# Patient Record
Sex: FEMALE | Race: WHITE | NOT HISPANIC OR LATINO | Employment: UNEMPLOYED | URBAN - METROPOLITAN AREA
[De-identification: names, ages, dates, MRNs, and addresses within clinical notes are randomized per-mention and may not be internally consistent; named-entity substitution may affect disease eponyms.]

---

## 2017-08-17 ENCOUNTER — HOSPITAL ENCOUNTER (OUTPATIENT)
Dept: MEDSURG UNIT | Facility: HOSPITAL | Age: 32
End: 2017-08-18
Attending: ORTHOPAEDIC SURGERY | Admitting: ORTHOPAEDIC SURGERY

## 2017-08-17 LAB
ABS NEUT (OLG): 8.22 X10(3)/MCL (ref 2.1–9.2)
APTT PPP: 30.7 SECOND(S) (ref 20.6–36)
BASOPHILS # BLD AUTO: 0 X10(3)/MCL (ref 0–0.2)
BASOPHILS NFR BLD AUTO: 0 %
BUN SERPL-MCNC: 13 MG/DL (ref 7–18)
CALCIUM SERPL-MCNC: 8.4 MG/DL (ref 8.5–10.1)
CHLORIDE SERPL-SCNC: 104 MMOL/L (ref 98–107)
CO2 SERPL-SCNC: 27 MMOL/L (ref 21–32)
CREAT SERPL-MCNC: 0.8 MG/DL (ref 0.55–1.02)
EOSINOPHIL # BLD AUTO: 0.2 X10(3)/MCL (ref 0–0.9)
EOSINOPHIL NFR BLD AUTO: 2 %
ERYTHROCYTE [DISTWIDTH] IN BLOOD BY AUTOMATED COUNT: 13.9 % (ref 11.5–17)
GLUCOSE SERPL-MCNC: 101 MG/DL (ref 74–106)
HCT VFR BLD AUTO: 34.4 % (ref 37–47)
HCT VFR BLD AUTO: 35.2 % (ref 37–47)
HGB BLD-MCNC: 11.2 GM/DL (ref 12–16)
HGB BLD-MCNC: 11.3 GM/DL (ref 12–16)
INR PPP: 1.03 (ref 0–1.27)
LYMPHOCYTES # BLD AUTO: 2.4 X10(3)/MCL (ref 0.6–4.6)
LYMPHOCYTES NFR BLD AUTO: 20 %
MCH RBC QN AUTO: 28.6 PG (ref 27–31)
MCHC RBC AUTO-ENTMCNC: 32.8 GM/DL (ref 33–36)
MCV RBC AUTO: 87.1 FL (ref 80–94)
MONOCYTES # BLD AUTO: 0.9 X10(3)/MCL (ref 0.1–1.3)
MONOCYTES NFR BLD AUTO: 8 %
NEUTROPHILS # BLD AUTO: 8.22 X10(3)/MCL (ref 1.4–7.9)
NEUTROPHILS NFR BLD AUTO: 69 %
PLATELET # BLD AUTO: 265 X10(3)/MCL (ref 130–400)
PMV BLD AUTO: 9.4 FL (ref 9.4–12.4)
POTASSIUM SERPL-SCNC: 4.3 MMOL/L (ref 3.5–5.1)
PROTHROMBIN TIME: 13.3 SECOND(S) (ref 12.1–14.2)
RBC # BLD AUTO: 3.95 X10(6)/MCL (ref 4.2–5.4)
SODIUM SERPL-SCNC: 138 MMOL/L (ref 136–145)
WBC # SPEC AUTO: 11.8 X10(3)/MCL (ref 4.5–11.5)

## 2017-08-18 LAB
ABS NEUT (OLG): 8.46 X10(3)/MCL (ref 2.1–9.2)
BASOPHILS # BLD AUTO: 0 X10(3)/MCL (ref 0–0.2)
BASOPHILS NFR BLD AUTO: 0 %
BUN SERPL-MCNC: 11 MG/DL (ref 7–18)
CALCIUM SERPL-MCNC: 8.4 MG/DL (ref 8.5–10.1)
CHLORIDE SERPL-SCNC: 106 MMOL/L (ref 98–107)
CO2 SERPL-SCNC: 26 MMOL/L (ref 21–32)
CREAT SERPL-MCNC: 0.71 MG/DL (ref 0.55–1.02)
EOSINOPHIL # BLD AUTO: 0 X10(3)/MCL (ref 0–0.9)
EOSINOPHIL NFR BLD AUTO: 0 %
ERYTHROCYTE [DISTWIDTH] IN BLOOD BY AUTOMATED COUNT: 14.2 % (ref 11.5–17)
GLUCOSE SERPL-MCNC: 108 MG/DL (ref 74–106)
HCT VFR BLD AUTO: 33.7 % (ref 37–47)
HGB BLD-MCNC: 10.5 GM/DL (ref 12–16)
LYMPHOCYTES # BLD AUTO: 3 X10(3)/MCL (ref 0.6–4.6)
LYMPHOCYTES NFR BLD AUTO: 23 %
MCH RBC QN AUTO: 27.4 PG (ref 27–31)
MCHC RBC AUTO-ENTMCNC: 31.2 GM/DL (ref 33–36)
MCV RBC AUTO: 88 FL (ref 80–94)
MONOCYTES # BLD AUTO: 1.2 X10(3)/MCL (ref 0.1–1.3)
MONOCYTES NFR BLD AUTO: 9 %
NEUTROPHILS # BLD AUTO: 8.46 X10(3)/MCL (ref 2.1–9.2)
NEUTROPHILS NFR BLD AUTO: 67 %
PLATELET # BLD AUTO: 279 X10(3)/MCL (ref 130–400)
PMV BLD AUTO: 9.6 FL (ref 9.4–12.4)
POTASSIUM SERPL-SCNC: 4.2 MMOL/L (ref 3.5–5.1)
RBC # BLD AUTO: 3.83 X10(6)/MCL (ref 4.2–5.4)
SODIUM SERPL-SCNC: 141 MMOL/L (ref 136–145)
WBC # SPEC AUTO: 12.7 X10(3)/MCL (ref 4.5–11.5)

## 2017-08-29 LAB — POC BETA-HCG (QUAL): NEGATIVE

## 2017-10-11 ENCOUNTER — HISTORICAL (OUTPATIENT)
Dept: ADMINISTRATIVE | Facility: HOSPITAL | Age: 32
End: 2017-10-11

## 2017-11-22 ENCOUNTER — HISTORICAL (OUTPATIENT)
Dept: ADMINISTRATIVE | Facility: HOSPITAL | Age: 32
End: 2017-11-22

## 2018-01-15 ENCOUNTER — HISTORICAL (OUTPATIENT)
Dept: ADMINISTRATIVE | Facility: HOSPITAL | Age: 33
End: 2018-01-15

## 2018-04-03 ENCOUNTER — HISTORICAL (OUTPATIENT)
Dept: ADMINISTRATIVE | Facility: HOSPITAL | Age: 33
End: 2018-04-03

## 2021-09-08 ENCOUNTER — HISTORICAL (OUTPATIENT)
Dept: HEMATOLOGY/ONCOLOGY | Facility: CLINIC | Age: 36
End: 2021-09-08

## 2022-04-29 NOTE — OP NOTE
DATE OF SURGERY:    08/17/2017    SURGEON:  Hardik Calix MD    PREOPERATIVE DIAGNOSIS:  Left bicondylar tibia plateau fracture.    POSTOPERATIVE DIAGNOSIS:  Left bicondylar tibia plateau fracture.    PROCEDURE:  Application of uniplanar external fixator left lower extremity.     Anesthesia:  General endotracheal anesthesia.     Estimated blood loss:  5 ml.     Implants:  [place] Thelma 3 external fixator system.     Complications:  None.     Counts:  All counts correct x two at the end of the case.    INDICATIONS FOR PROCEDURE:  Mrs. Garcia is a 32-year-old female who sustained a left displaced bicondylar tibial plateau fracture with lateral joint line depression.  She had significant pain, swelling and ecchymosis.  She presented to my office approximately four days after the injury.  Her limb was too swollen to undergo definitive management.  She had significant pain with any range of motion.  We discussed the risks and benefits and alternatives to treatment and she elected to undergo application of external fixator in order to allow for more rigid stabilization and soft tissue rest with plans for a future staged procedure with removal of the external fixator and definitive internal fixation once her soft tissue swelling has diminished.    PROCEDURE IN DETAIL:  After informed consent was obtained the patient was met in the preoperative holding area and the site was marked.  She was taken to the operating room and placed supine on the operating table and general endotracheal anesthesia was induced.  All bony prominences were well padded and preoperative antibiotics were given.  Her left lower extremity was prepped and draped in the standard sterile fashion.  A time out was done to indicate the correct operative limb and procedure.  Starting first with pins in the femur they were localized under fluoroscopic guidance and two pins were placed.  They were confirmed to be in appropriate position.  A multihole  clamp was then applied and two pins were then placed anterior to posterior in the tibia.  They were predrilled and again a multihole clamp was applied.  Cross connectors were applied.  She was brought out of varus alignment.  She was noted to be in good position on the lateral view.  The bars were locked into position and all were confirmed to be appropriately tightened and her limb was stabilized.  Pin sites were cleaned and dressed with Xeroform and Kerlix rolls and her limb was dressed with cast padding and an Ace bandage.  The patient was awakened, extubated and taken to recovery in stable condition.    POSTOPERATIVE PLAN:  She will be admitted to the floor for observation for pain control overnight tonight.  We will have her mobilize with PT and plan for discharge home tomorrow.  She will return to the clinic next week.  We will evaluate her soft tissues and plan for future fixation of her fracture at that time.        ______________________________  MD SIM Cho/JUDI  DD:  08/17/2017  Time:  10:28AM  DT:  08/17/2017  Time:  03:31PM  Job #:  [id number]

## 2022-04-29 NOTE — DISCHARGE SUMMARY
* Final Report *  Ortho Progress Note     Patient:   Maggie Garcia             MRN: [id number]            FIN: [id number]               Age:   32 years     Sex:  Female     :  1985   Associated Diagnoses:   Displaced bicondylar fracture of left tibia, initial encounter for closed fracture   Author:   Hardik Calix MD      Basic Information   POD#1 s/p ex-fix to LLE for tibia plateau fracture. Doing very well today. Pain well controlled. Plan to go home today.      Physical Examination   General:  Alert and oriented, No acute distress.       Vital Signs (last 24 hrs)_____  Last Charted___________  Temp Oral     36.5 DegC  (AUG 18 03:40)  Heart Rate Peripheral   74 bpm  (AUG 18 03:40)  Resp Rate         16 br/min  (AUG 18 03:40)  SBP      112 mmHg  (AUG 18 03:40)  DBP      74 mmHg  (AUG 18 03:40)  SpO2      98 %  (AUG 18 03:40)  Weight      104.32 kg  (AUG 17 08:04)  Height      172.72 cm  (AUG 17 08:04)  BMI      34.97  (AUG 17 08:04)     Musculoskeletal:  LLE: Dressings all clean and dry. Ex-fix in place. Brisk cap refill to all digits. Intact ehl/fhl, ta/gs. .    Neurologic:  Alert, Oriented.    Psychiatric:  Cooperative, Appropriate mood & affect.       Impression and Plan   Diagnosis     Displaced bicondylar fracture of left tibia, initial encounter for closed fracture ([id number] S82.142A).       Doing well and ready to go home today. She will follow up with me in 1 week to evaluate her swelling and plan for definitive internal fixation. Remain NWB to LLE. Change dressing daily starting in 48 hours.      Result type: Orthopedic Progress Note  Result date: 2017 7:22 CDT  Result status: Auth (Verified)  Result title: Ortho Progress Note  Performed by: Hardik Calix MD on 2017 7:26 CDT  Verified by: Hardik Calix MD on 2017 7:26 CDT  Encounter info: [id number], [facility], Observation, 2017 - 2017    Moved by HIM

## 2022-05-03 NOTE — HISTORICAL OLG CERNER
This is a historical note converted from Carmen. Formatting and pictures may have been removed.  Please reference Carmen for original formatting and attached multimedia. Chief Complaint  6 WEEK FOLLOW UP LEFT TIBIAL PLATEAU FX, S/P ORIF, NWB,  History of Present Illness  Here for follow-up evaluation status post?open reduction internal fixation left displaced?depressed lateral tibial plateau fracture with repair of lateral meniscus.?Shes been doing well, working with physical therapy on range of motion.?She is off all pain medicine at this time.?Shes been compliant with her nonweightbearing to left lower extremity.  Review of Systems  Negative suction history of present illness  Physical Exam  Vitals & Measurements  HR:?98?(Peripheral)? RR:?20? BP:?117/75?  HT:?163?cm? HT:?163?cm? WT:?51?kg? WT:?51?kg? BMI:?19.2?  Left lower extremity: No calf?swelling, tenderness or redness. Surgical incisions well-healed.?No effusion noted knee.?Lacking less than 5??of full extension with flexion up to 95?. No varus valgus instability noted. 2+ DP pulse.?Neurovascularly intact distally.  Assessment/Plan  Displaced bicondylar fracture of left tibia, initial encounter for closed fracture  ?  ?  She is doing very well today happy with the progress that shes made. She can discontinue the use of her hinged knee brace at this time.?Well update her physical therapy orders to have her begin a partial weightbearing?program in 4 weeks with increasing?percentages over the subsequent weeks.?She will return to me in 6 weeks with repeat x-rays left knee.?We discussed the need for her to focus on obtaining full extension?and working on the flexion in her knee?work on her strengthening.?She is happy with her progress?and understands and agrees?with our plan today .  ?  ?  Ordered:  Clinic Follow up, *Est. 11/22/17 9:45:00 CST, Order for future visit, Displaced bicondylar fracture of left tibia, initial encounter for closed fracture, [facility]  [place]  Post-Op follow-up visit [id number] PC, Displaced bicondylar fracture of left tibia, initial encounter for closed fracture, [facility], 10/11/17 11:21:00 CDT  PT/OT External Referral, 10/11/17 11:23:00 CDT, Displaced bicondylar fracture of left tibia, initial encounter for closed fracture, Mobilization  Stretch and Strengthen  Therapeutic Excercise, 25% Wt Bearing X 1 week, 50% Wt Bearing 2nd Week, 75% WB 3rd Week; then FWB, 3 X...  XR Knee Left 1 or 2 Views, Routine, *Est. 11/22/17 3:00:00 CST, Post-Op, None, Patient Bed, Patient Has IV?, Rad Type, Order for future visit, Displaced bicondylar fracture of left tibia, initial encounter for closed fracture, Not Scheduled, 6, week(s), In Approximately, *Est....  ?   Problem List/Past Medical History  Ongoing  Acute deep vein thrombosis (DVT) of left lower extremity  Closed fracture of left tibial plateau  Displaced bicondylar fracture of left tibia, initial encounter for closed fracture  Obesity  Tear of lateral meniscus of left knee  Historical  Procedure/Surgical History  ORIF Tibia Plateau (Left) (08/29/2017)  Removal External Fixator Lower Extremity (Left) (08/29/2017)  Removal of External Fixation Device from Left Tibia, External Approach (08/29/2017)  Repair Left Knee Bursa and Ligament, Open Approach (08/29/2017)  Reposition Left Tibia with Internal Fixation Device, Open Approach (08/29/2017)  Application of a uniplane (pins or wires in 1 plane), unilateral, external fixation system (08/17/2017)  External Fixation Device Application Lower (Left) (08/17/2017)  Insertion of External Fixation Device into Left Tibia, Percutaneous Approach (08/17/2017)  Medications  Valium 5 mg oral tablet, 5 mg= 1 tab(s), Oral, QID, PRN,? ?Not taking  Allergies  No Known Medication Allergies  Social History  Alcohol - 08/24/2017  Current, 1-2 times per month  Substance Abuse - 08/24/2017  Never  Tobacco - 08/24/2017  Former smoker, Stopped age 24 Years.  Family  History  Acute myocardial infarction.: Mother.  Diabetes mellitus type 1.: Mother.  Hypertension.: Mother.  Primary malignant neoplasm of female genital organ: Mother.  Diagnostic Results  Left knee 2 views:?Hardware intact. Alignment maintained.?No evidence of?settling?of the lateral plateau

## 2022-05-03 NOTE — HISTORICAL OLG CERNER
This is a historical note converted from Carmen. Formatting and pictures may have been removed.  Please reference Carmen for original formatting and attached multimedia. Chief Complaint  4.5 MONTH FOLLOW UP LEFT TIBIAL PLATEAU ORIF, NO COMPLAINTS, AMBULATING WITHOUT ASSISTANCE  History of Present Illness  Pt now 4.5 months s/p left bicondylar tibial plateau ORIF. Here today for x-rays. Doing well overall.  Review of Systems  Review of Systems?  ????Constitutional: ?Negative. ?  ????Eye: ?Negative. ?  ????Ear/Nose/Mouth/Throat: ?Negative. ?  ????Respiratory: ?Negative. ?  ????Cardiovascular: ?Negative. ?  ????Gastrointestinal: ?Negative. ?  ????Genitourinary: ?Negative. ?  ????Hematology/Lymphatics: ?Negative. ?  ????Endocrine: ?Negative. ?  ????Immunologic: ?Negative. ?  ????Musculoskeletal: ?Negative except HPI. ?  ????Integumentary: ?Negative. ?  ????Neurologic: ?Negative. ?  ????Psychiatric: ?Negative. ?  ????All other systems are negative  Physical Exam  Vitals & Measurements  HR:?98?(Peripheral)? RR:?20? BP:?117/75?  HT:?163?cm? HT:?163?cm? WT:?51?kg? WT:?51?kg? BMI:?19.2?  ????General-Alert, oriented, no fever, chills  ????HEENT-Normocephalic, EOMI, moist oral mucosa, neck supple and nontender  ????Respiratory-Nonlabored respirations, symmetric chest expansion, chest wall nontender  ????Cardiac-Regular rate and rhythm, Pulses palpable in all extremities, Normal peripheral perfusion  ????Gastrointestinal-Soft, nontender, nondistended  ????Hemo/Lymph-No lymphadenopathy  ????Yonzjkuhsxvvnuh-OTT-Yugnnmvso completely healed. Nontender over plates. ROM knee 0-95. No knee laxity.?NVID, except mild tingling in?small toe occasionally.  ????Neurologic-Alert and oriented x4, cooperative  ????Dermatologic-Skin warm, pink, dry.  Assessment/Plan  Displaced bicondylar fracture of left tibia, initial encounter for closed fracture  Ordered:  Clinic Follow up, *Est. 04/15/18 3:00:00 CDT, Order for future visit, Displaced  bicondylar fracture of left tibia, initial encounter for closed fracture, [facility]  Office/Outpatient Visit Level 3 Established [id number] PC, Displaced bicondylar fracture of left tibia, initial encounter for closed fracture, [facility], 01/15/18 8:32:00 CST  PT/OT External Referral, 01/15/18 8:32:00 CST, Displaced bicondylar fracture of left tibia, initial encounter for closed fracture, Evaluate and Treat, 3 X Week, Patient has IV, Standard Precautions, FWBAT LLE. FROM left knee. Stretching/strengthening. All modalities welcomed.  ?  ?  Continue working with PT on stretching/strengthening. Will see back in clinic in 3 months for x-rays. At risk for post-traumatic arthritis, but overall seems to be advancing well.   Problem List/Past Medical History  Ongoing  Acute deep vein thrombosis (DVT) of left lower extremity  Closed fracture of left tibial plateau  Displaced bicondylar fracture of left tibia, initial encounter for closed fracture  Obesity  Tear of lateral meniscus of left knee  Historical  Procedure/Surgical History  ORIF Tibia Plateau (Left) (08/29/2017)  Removal External Fixator Lower Extremity (Left) (08/29/2017)  Removal of External Fixation Device from Left Tibia, External Approach (08/29/2017)  Repair Left Knee Bursa and Ligament, Open Approach (08/29/2017)  Reposition Left Tibia with Internal Fixation Device, Open Approach (08/29/2017)  Application of a uniplane (pins or wires in 1 plane), unilateral, external fixation system (08/17/2017)  External Fixation Device Application Lower (Left) (08/17/2017)  Insertion of External Fixation Device into Left Tibia, Percutaneous Approach (08/17/2017)  Medications  Valium 5 mg oral tablet, 5 mg= 1 tab(s), Oral, QID, PRN,? ?Not taking  XARELTO 20 MG TABLET, Oral  Allergies  No Known Medication Allergies  Social History  Alcohol - 08/24/2017  Current, 1-2 times per month  Substance Abuse - 08/24/2017  Never  Tobacco - 08/24/2017  Former smoker,  Stopped age 24 Years.  Family History  Acute myocardial infarction.: Mother.  Diabetes mellitus type 1.: Mother.  Hypertension.: Mother.  Primary malignant neoplasm of female genital organ: Mother.  Diagnostic Results  X-ray left knee shows anatomic alignment, mild valgus, intact hardware. Fracture healed.      Patient evaluated and discussed with James Gaines NP. I agree with his assessment and plan of care with any exceptions or additions noted.

## 2022-05-03 NOTE — HISTORICAL OLG CERNER
This is a historical note converted from Carmen. Formatting and pictures may have been removed.  Please reference Carmen for original formatting and attached multimedia. Chief Complaint  7 month follow up left tibial plateau fx, s/p orif. No complaints  History of Present Illness  Here today follow-up evaluation?status post ORIF?left tibial plateau?7 months ago.?She states that she started to feel more like normal.?She still has some?aches and pains in the knee medication.?She is finished with physical therapy has been working on her exercises on her own.  Review of Systems  10 system review is performed which is negative other than the history of present illness  Physical Exam  Vitals & Measurements  HR:?98(Peripheral)? RR:?20? BP:?117/75?  HT:?160?cm? HT:?160?cm? WT:?74.4?kg? WT:?74.4?kg? BMI:?29.06?  GEN: Well-developed, well-nourished. Awake alert and oriented. In no distress.  ???  HEENT: NCAT, EOMI  ???  CV: Normal rhythm, regular rate, normal peripheral perfusion  ???  PULM: Unlabored respirations with symmetric chest rise  ???  ABD: Soft, nontender, nondistended  ???  Integument: Clean and dry, no open wounds or lesions  ?  Left lower extremity:?Surgical incisions all well-healed.?Full extension, flexion 130?.?No varus valgus instability noted.?Mild patellofemoral crepitus noted with?compression during?range of motion.?No painful or prominent hardware noted.?5 out of 5 motor strength distally.  Assessment/Plan  Displaced bicondylar fracture of left tibia, initial encounter for closed fracture  Ordered:  Office/Outpatient Visit Level 2 Established [id number] PC, Displaced bicondylar fracture of left tibia, initial encounter for closed fracture, [facility], 04/03/18 9:44:00 CDT  Office/Outpatient Visit Level 2 Established [id number] PC, Displaced bicondylar fracture of left tibia, initial encounter for closed fracture, [facility], 04/03/18 15:16:00 CDT  ?  Orders:  Clinic Follow-up PRN,  04/03/18 15:16:00 CDT, Future Order, [facility]  Clinic Follow-up PRN, 04/03/18 9:44:00 CDT, Future Order, [facility]  ?  ?  Happy with the progress that shes made. She still has slight valgus alignment?to her knee?due to the?large area of depression?of her joint line. Shes had no further collapse since her previous films. Her hardware remains intact.?She understands that she is at risk for development of?posttraumatic arthritis the left knee the future?and may one day require a total knee arthroplasty.?She is released to full activities at this time?I have no restrictions on her?and she can follow-up with me on an as-needed basis should any new issues arise.?She understands and agrees with all that we have discussed and all questions and concerns were addressed.   Problem List/Past Medical History  Ongoing  Acute deep vein thrombosis (DVT) of left lower extremity  Closed fracture of left tibial plateau  Displaced bicondylar fracture of left tibia, initial encounter for closed fracture  Obesity  Tear of lateral meniscus of left knee  Historical  No qualifying data  Procedure/Surgical History  ORIF Tibia Plateau (Left) (08/29/2017), Removal External Fixator Lower Extremity (Left) (08/29/2017), Removal of External Fixation Device from Left Tibia, External Approach (08/29/2017), Repair Left Knee Bursa and Ligament, Open Approach (08/29/2017), Reposition Left Tibia with Internal Fixation Device, Open Approach (08/29/2017), Application of a uniplane (pins or wires in 1 plane), unilateral, external fixation system (08/17/2017), External Fixation Device Application Lower (Left) (08/17/2017), Insertion of External Fixation Device into Left Tibia, Percutaneous Approach (08/17/2017).  Medications  Valium 5 mg oral tablet, 5 mg= 1 tab(s), Oral, QID, PRN,? ?Not taking  XARELTO 20 MG TABLET, Oral  Allergies  No Known Medication Allergies  Social History  Alcohol  Current, 1-2 times per month,  08/16/2017  Substance Abuse  Never, 08/24/2017  Tobacco  Former smoker, Stopped age 24 Years., 08/16/2017  Family History  Acute myocardial infarction.: Mother.  Diabetes mellitus type 1.: Mother.  Hypertension.: Mother.  Primary malignant neoplasm of female genital organ: Mother.  Diagnostic Results  Left knee 2 views:?Alignment unchanged from previous films. No further collapse?noted along the lateral?joint line.?Hardware intact.

## 2022-05-03 NOTE — HISTORICAL OLG CERNER
This is a historical note converted from Carmen. Formatting and pictures may have been removed.  Please reference Carmen for original formatting and attached multimedia. Chief Complaint  LEFT TIBIA FX ON 8/29/17. WALKING WITH CRUTCHES. IN P.T. NO PAIN. NOT IN BRACE  History of Present Illness  Here today for follow-up evaluation status post?open reduction internal fixation?left?lateral?plateau?depression. Shes been working with physical therapy. She states that she has been?encouraged percent weightbearing.?She has been working on her range of motion as well.?Her pain has been well controlled  Review of Systems  Negative aside from history of present illness  Physical Exam  Vitals & Measurements  BP:?117/75?  HT:?163?cm? HT:?163?cm? WT:?51?kg? WT:?51?kg? BMI:?19.2?  Left lower extremity:?Surgical incision well-healed.?Range of motion from 0-120? with pain at terminal ends of motion.?2+ DP pulse.?Sensation light touch intact distally.?5 out of 5 motor strength distally.  Assessment/Plan  Displaced bicondylar fracture of left tibia, initial encounter for closed fracture  Ordered:  Clinic Follow up, *Est. 01/22/18 3:00:00 CST, Order for future visit, Displaced bicondylar fracture of left tibia, initial encounter for closed fracture, [facility]  Post-Op follow-up visit [id number] PC, Displaced bicondylar fracture of left tibia, initial encounter for closed fracture, [facility], 11/22/17 12:30:00 CST  XR Knee Left 1 or 2 Views, Routine, *Est. 01/17/18 3:00:00 CST, Post-Op, None, Patient Bed, Patient Has IV?, Rad Type, Order for future visit, Displaced bicondylar fracture of left tibia, initial encounter for closed fracture, Not Scheduled, 8, week(s), In Approximately, *Est....  ?  She can progress to weightbearing as tolerated to left lower extremity?with PT.?In 2 to work on range of motion exercises. Wean from her crutches as tolerated. Follow-up in 2 months repeat x-rays and repeat clinical  evaluation.?She understands and agrees?with all that we have discussed and all questions and concerns were addressed.  ?  ?   Problem List/Past Medical History  Ongoing  Acute deep vein thrombosis (DVT) of left lower extremity  Closed fracture of left tibial plateau  Displaced bicondylar fracture of left tibia, initial encounter for closed fracture  Obesity  Tear of lateral meniscus of left knee  Historical  Procedure/Surgical History  ORIF Tibia Plateau (Left) (08/29/2017)  Removal External Fixator Lower Extremity (Left) (08/29/2017)  Removal of External Fixation Device from Left Tibia, External Approach (08/29/2017)  Repair Left Knee Bursa and Ligament, Open Approach (08/29/2017)  Reposition Left Tibia with Internal Fixation Device, Open Approach (08/29/2017)  Application of a uniplane (pins or wires in 1 plane), unilateral, external fixation system (08/17/2017)  External Fixation Device Application Lower (Left) (08/17/2017)  Insertion of External Fixation Device into Left Tibia, Percutaneous Approach (08/17/2017)  Medications  Valium 5 mg oral tablet, 5 mg= 1 tab(s), Oral, QID, PRN  XARELTO 20 MG TABLET, Oral  Allergies  No Known Medication Allergies  Social History  Alcohol - 08/24/2017  Current, 1-2 times per month  Substance Abuse - 08/24/2017  Never  Tobacco - 08/24/2017  Former smoker, Stopped age 24 Years.  Family History  Acute myocardial infarction.: Mother.  Diabetes mellitus type 1.: Mother.  Hypertension.: Mother.  Primary malignant neoplasm of female genital organ: Mother.  Diagnostic Results  Her fractures appear well-healed on?AP and lateral radiographs obtained today.?No evidence of further?lateral?joint?depression.

## 2023-10-09 PROBLEM — Z76.89 ENCOUNTER TO ESTABLISH CARE: Status: ACTIVE | Noted: 2023-10-09

## 2023-10-09 PROBLEM — Z87.891 HISTORY OF TOBACCO USE: Status: ACTIVE | Noted: 2023-10-09

## 2023-10-09 PROBLEM — E66.9 OBESITY: Status: ACTIVE | Noted: 2023-10-09

## 2023-10-19 ENCOUNTER — LAB VISIT (OUTPATIENT)
Dept: LAB | Facility: HOSPITAL | Age: 38
End: 2023-10-19
Attending: INTERNAL MEDICINE
Payer: COMMERCIAL

## 2023-10-19 DIAGNOSIS — Z13.1 SCREENING FOR DIABETES MELLITUS: ICD-10-CM

## 2023-10-19 DIAGNOSIS — Z01.89 LABORATORY PROCEDURES: ICD-10-CM

## 2023-10-19 DIAGNOSIS — Z13.21 ENCOUNTER FOR VITAMIN DEFICIENCY SCREENING: ICD-10-CM

## 2023-10-19 DIAGNOSIS — Z01.89 ENCOUNTER FOR OTHER SPECIFIED SPECIAL EXAMINATIONS: ICD-10-CM

## 2023-10-19 DIAGNOSIS — Z00.00 ENCOUNTER FOR MEDICAL EXAMINATION TO ESTABLISH CARE: ICD-10-CM

## 2023-10-19 DIAGNOSIS — Z79.899 ENCOUNTER FOR LONG-TERM (CURRENT) USE OF OTHER MEDICATIONS: ICD-10-CM

## 2023-10-19 DIAGNOSIS — Z13.6 SCREENING FOR CARDIOVASCULAR CONDITION: ICD-10-CM

## 2023-10-19 DIAGNOSIS — Z13.220 SCREENING FOR LIPOID DISORDERS: ICD-10-CM

## 2023-10-19 DIAGNOSIS — Z87.891 HISTORY OF TOBACCO USE: ICD-10-CM

## 2023-10-19 DIAGNOSIS — E66.9 OBESITY, UNSPECIFIED CLASSIFICATION, UNSPECIFIED OBESITY TYPE, UNSPECIFIED WHETHER SERIOUS COMORBIDITY PRESENT: ICD-10-CM

## 2023-10-19 DIAGNOSIS — Z13.29 SCREENING FOR THYROID DISORDER: ICD-10-CM

## 2023-10-19 LAB
ALBUMIN SERPL BCP-MCNC: 3.7 G/DL (ref 3.5–5.2)
ALBUMIN/CREAT UR: 22.5 UG/MG (ref 0–30)
ALP SERPL-CCNC: 61 U/L (ref 55–135)
ALT SERPL W/O P-5'-P-CCNC: 31 U/L (ref 10–44)
ANION GAP SERPL CALC-SCNC: 2 MMOL/L (ref 3–11)
AST SERPL-CCNC: 19 U/L (ref 10–40)
BACTERIA #/AREA URNS HPF: NEGATIVE /HPF
BASOPHILS # BLD AUTO: 0.04 K/UL (ref 0–0.2)
BASOPHILS NFR BLD: 0.6 % (ref 0–1.9)
BILIRUB SERPL-MCNC: 0.3 MG/DL (ref 0.1–1)
BILIRUB UR QL STRIP: NEGATIVE
BUN SERPL-MCNC: 18 MG/DL (ref 6–20)
CALCIUM SERPL-MCNC: 8.7 MG/DL (ref 8.7–10.5)
CHLORIDE SERPL-SCNC: 107 MMOL/L (ref 95–110)
CHOLEST SERPL-MCNC: 148 MG/DL (ref 120–199)
CHOLEST/HDLC SERPL: 3 {RATIO} (ref 2–5)
CLARITY UR: CLEAR
CO2 SERPL-SCNC: 28 MMOL/L (ref 23–29)
COLOR UR: YELLOW
CREAT SERPL-MCNC: 1 MG/DL (ref 0.5–1.4)
CREAT UR-MCNC: 211 MG/DL (ref 15–325)
CRP SERPL-MCNC: 5.75 MG/L (ref 0–3.19)
DIFFERENTIAL METHOD: ABNORMAL
EOSINOPHIL # BLD AUTO: 0.2 K/UL (ref 0–0.5)
EOSINOPHIL NFR BLD: 2.6 % (ref 0–8)
ERYTHROCYTE [DISTWIDTH] IN BLOOD BY AUTOMATED COUNT: 14 % (ref 11.5–14.5)
EST. GFR  (NO RACE VARIABLE): >60 ML/MIN/1.73 M^2
ESTIMATED AVG GLUCOSE: 120 MG/DL (ref 68–131)
FOLATE SERPL-MCNC: 19.4 NG/ML (ref 4–24)
GLUCOSE SERPL-MCNC: 109 MG/DL (ref 70–110)
GLUCOSE UR QL STRIP: NEGATIVE
HBA1C MFR BLD: 5.8 % (ref 4–5.6)
HCT VFR BLD AUTO: 41.2 % (ref 37–48.5)
HCV AB SERPL QL IA: NORMAL
HDLC SERPL-MCNC: 50 MG/DL (ref 40–75)
HDLC SERPL: 33.8 % (ref 20–50)
HGB BLD-MCNC: 13.1 G/DL (ref 12–16)
HGB UR QL STRIP: ABNORMAL
HIV 1+2 AB+HIV1 P24 AG SERPL QL IA: NORMAL
HYALINE CASTS #/AREA URNS LPF: 1.1 /LPF
IMM GRANULOCYTES # BLD AUTO: 0.01 K/UL (ref 0–0.04)
IMM GRANULOCYTES NFR BLD AUTO: 0.1 % (ref 0–0.5)
INSULIN COLLECTION INTERVAL: NORMAL
INSULIN SERPL-ACNC: 20.2 UU/ML
KETONES UR QL STRIP: NEGATIVE
LDLC SERPL CALC-MCNC: 84.2 MG/DL (ref 63–159)
LEUKOCYTE ESTERASE UR QL STRIP: ABNORMAL
LYMPHOCYTES # BLD AUTO: 3.1 K/UL (ref 1–4.8)
LYMPHOCYTES NFR BLD: 42.3 % (ref 18–48)
MAGNESIUM SERPL-MCNC: 2 MG/DL (ref 1.6–2.6)
MCH RBC QN AUTO: 27.5 PG (ref 27–31)
MCHC RBC AUTO-ENTMCNC: 31.8 G/DL (ref 32–36)
MCV RBC AUTO: 86 FL (ref 82–98)
MICROALBUMIN UR DL<=1MG/L-MCNC: 47.4 MG/L
MICROSCOPIC COMMENT: ABNORMAL
MONOCYTES # BLD AUTO: 0.5 K/UL (ref 0.3–1)
MONOCYTES NFR BLD: 7.2 % (ref 4–15)
NEUTROPHILS # BLD AUTO: 3.4 K/UL (ref 1.8–7.7)
NEUTROPHILS NFR BLD: 47.2 % (ref 38–73)
NITRITE UR QL STRIP: NEGATIVE
NONHDLC SERPL-MCNC: 98 MG/DL
NRBC BLD-RTO: 0 /100 WBC
PH UR STRIP: 6 [PH] (ref 5–8)
PLATELET # BLD AUTO: 293 K/UL (ref 150–450)
PMV BLD AUTO: 10.5 FL (ref 9.2–12.9)
POTASSIUM SERPL-SCNC: 4.4 MMOL/L (ref 3.5–5.1)
PROT SERPL-MCNC: 7.7 G/DL (ref 6–8.4)
PROT UR QL STRIP: NEGATIVE
RBC # BLD AUTO: 4.77 M/UL (ref 4–5.4)
RBC #/AREA URNS HPF: 2 /HPF (ref 0–4)
SODIUM SERPL-SCNC: 137 MMOL/L (ref 136–145)
SP GR UR STRIP: 1.02 (ref 1–1.03)
SQUAMOUS #/AREA URNS HPF: 2 /HPF
T3FREE SERPL-MCNC: 3.1 PG/ML (ref 2.3–4.2)
T4 FREE SERPL-MCNC: 0.98 NG/DL (ref 0.71–1.51)
TRIGL SERPL-MCNC: 69 MG/DL (ref 30–150)
TSH SERPL DL<=0.005 MIU/L-ACNC: 0.99 UIU/ML (ref 0.4–4)
URN SPEC COLLECT METH UR: ABNORMAL
UROBILINOGEN UR STRIP-ACNC: NEGATIVE EU/DL
VIT B12 SERPL-MCNC: 423 PG/ML (ref 210–950)
WBC # BLD AUTO: 7.24 K/UL (ref 3.9–12.7)
WBC #/AREA URNS HPF: 5 /HPF (ref 0–5)

## 2023-10-19 PROCEDURE — 86141 C-REACTIVE PROTEIN HS: CPT | Performed by: INTERNAL MEDICINE

## 2023-10-19 PROCEDURE — 83036 HEMOGLOBIN GLYCOSYLATED A1C: CPT | Performed by: INTERNAL MEDICINE

## 2023-10-19 PROCEDURE — 82607 VITAMIN B-12: CPT | Performed by: INTERNAL MEDICINE

## 2023-10-19 PROCEDURE — 82746 ASSAY OF FOLIC ACID SERUM: CPT | Performed by: INTERNAL MEDICINE

## 2023-10-19 PROCEDURE — 83525 ASSAY OF INSULIN: CPT | Performed by: INTERNAL MEDICINE

## 2023-10-19 PROCEDURE — 85025 COMPLETE CBC W/AUTO DIFF WBC: CPT | Performed by: INTERNAL MEDICINE

## 2023-10-19 PROCEDURE — 86677 HELICOBACTER PYLORI ANTIBODY: CPT | Performed by: INTERNAL MEDICINE

## 2023-10-19 PROCEDURE — 83695 ASSAY OF LIPOPROTEIN(A): CPT | Performed by: INTERNAL MEDICINE

## 2023-10-19 PROCEDURE — 86803 HEPATITIS C AB TEST: CPT | Performed by: INTERNAL MEDICINE

## 2023-10-19 PROCEDURE — 82652 VIT D 1 25-DIHYDROXY: CPT | Performed by: INTERNAL MEDICINE

## 2023-10-19 PROCEDURE — 82172 ASSAY OF APOLIPOPROTEIN: CPT | Performed by: INTERNAL MEDICINE

## 2023-10-19 PROCEDURE — 87389 HIV-1 AG W/HIV-1&-2 AB AG IA: CPT | Performed by: INTERNAL MEDICINE

## 2023-10-19 PROCEDURE — 86592 SYPHILIS TEST NON-TREP QUAL: CPT | Performed by: INTERNAL MEDICINE

## 2023-10-19 PROCEDURE — 83698 ASSAY LIPOPROTEIN PLA2: CPT | Performed by: INTERNAL MEDICINE

## 2023-10-19 PROCEDURE — 84443 ASSAY THYROID STIM HORMONE: CPT | Performed by: INTERNAL MEDICINE

## 2023-10-19 PROCEDURE — 36415 COLL VENOUS BLD VENIPUNCTURE: CPT | Performed by: INTERNAL MEDICINE

## 2023-10-19 PROCEDURE — 80053 COMPREHEN METABOLIC PANEL: CPT | Performed by: INTERNAL MEDICINE

## 2023-10-19 PROCEDURE — 80061 LIPID PANEL: CPT | Performed by: INTERNAL MEDICINE

## 2023-10-19 PROCEDURE — 84439 ASSAY OF FREE THYROXINE: CPT | Performed by: INTERNAL MEDICINE

## 2023-10-19 PROCEDURE — 81000 URINALYSIS NONAUTO W/SCOPE: CPT | Performed by: INTERNAL MEDICINE

## 2023-10-19 PROCEDURE — 82043 UR ALBUMIN QUANTITATIVE: CPT | Performed by: INTERNAL MEDICINE

## 2023-10-19 PROCEDURE — 84481 FREE ASSAY (FT-3): CPT | Performed by: INTERNAL MEDICINE

## 2023-10-19 PROCEDURE — 83735 ASSAY OF MAGNESIUM: CPT | Performed by: INTERNAL MEDICINE

## 2023-10-20 LAB
H PYLORI IGG SERPL QL IA: NEGATIVE
RPR SER QL: NORMAL

## 2023-10-21 LAB — APO B SERPL-MCNC: 78 MG/DL

## 2023-10-23 LAB — 1,25(OH)2D3 SERPL-MCNC: 74 PG/ML (ref 20–79)

## 2023-10-24 LAB
LP-PLA2 SERPL-CCNC: 96 NMOL/MIN/ML
LPA SERPL-MCNC: 19 MG/DL (ref 0–30)

## 2023-11-22 PROBLEM — R79.82 CRP ELEVATED: Status: ACTIVE | Noted: 2023-11-22

## 2023-11-22 PROBLEM — R73.01 IFG (IMPAIRED FASTING GLUCOSE): Status: ACTIVE | Noted: 2023-11-22

## 2024-01-08 PROBLEM — B34.9 VIRAL SYNDROME: Status: ACTIVE | Noted: 2024-01-08

## 2024-05-29 DIAGNOSIS — O09.522 SUPERVISION OF ELDERLY MULTIGRAVIDA IN SECOND TRIMESTER: Primary | ICD-10-CM

## 2024-06-12 DIAGNOSIS — O09.522 SUPERVISION OF ELDERLY MULTIGRAVIDA IN SECOND TRIMESTER: Primary | ICD-10-CM

## 2024-06-12 DIAGNOSIS — Z36.89 ENCOUNTER FOR FETAL ANATOMIC SURVEY: ICD-10-CM

## 2024-06-17 ENCOUNTER — PROCEDURE VISIT (OUTPATIENT)
Dept: MATERNAL FETAL MEDICINE | Facility: CLINIC | Age: 39
End: 2024-06-17
Payer: COMMERCIAL

## 2024-06-17 ENCOUNTER — OFFICE VISIT (OUTPATIENT)
Dept: MATERNAL FETAL MEDICINE | Facility: CLINIC | Age: 39
End: 2024-06-17
Payer: COMMERCIAL

## 2024-06-17 VITALS
HEART RATE: 85 BPM | SYSTOLIC BLOOD PRESSURE: 124 MMHG | DIASTOLIC BLOOD PRESSURE: 84 MMHG | BODY MASS INDEX: 37.33 KG/M2 | HEIGHT: 69 IN | WEIGHT: 252 LBS

## 2024-06-17 DIAGNOSIS — O09.90 AT HIGH RISK FOR COMPLICATIONS OF INTRAUTERINE PREGNANCY (IUP): ICD-10-CM

## 2024-06-17 DIAGNOSIS — O09.522 SUPERVISION OF ELDERLY MULTIGRAVIDA IN SECOND TRIMESTER: ICD-10-CM

## 2024-06-17 DIAGNOSIS — Z36.89 ENCOUNTER FOR FETAL ANATOMIC SURVEY: ICD-10-CM

## 2024-06-17 DIAGNOSIS — Z87.59 HISTORY OF GESTATIONAL HYPERTENSION: ICD-10-CM

## 2024-06-17 DIAGNOSIS — Z87.59 HISTORY OF DEEP VEIN THROMBOSIS (DVT) DURING PREGNANCY: ICD-10-CM

## 2024-06-17 DIAGNOSIS — Z87.59 HISTORY OF DEEP VEIN THROMBOSIS (DVT) DURING PREGNANCY: Primary | ICD-10-CM

## 2024-06-17 DIAGNOSIS — Z86.32 HISTORY OF GESTATIONAL DIABETES MELLITUS (GDM) IN PRIOR PREGNANCY, CURRENTLY PREGNANT: ICD-10-CM

## 2024-06-17 DIAGNOSIS — Z86.718 HISTORY OF DEEP VEIN THROMBOSIS (DVT) DURING PREGNANCY: Primary | ICD-10-CM

## 2024-06-17 DIAGNOSIS — O24.414 INSULIN CONTROLLED GESTATIONAL DIABETES MELLITUS (GDM) IN SECOND TRIMESTER: Primary | ICD-10-CM

## 2024-06-17 DIAGNOSIS — Z86.718 HISTORY OF DEEP VEIN THROMBOSIS (DVT) DURING PREGNANCY: ICD-10-CM

## 2024-06-17 DIAGNOSIS — O10.012 PRE-EXISTING ESSENTIAL HYPERTENSION COMPLICATING PREGNANCY IN SECOND TRIMESTER: ICD-10-CM

## 2024-06-17 DIAGNOSIS — O10.012 PRE-EXISTING ESSENTIAL HYPERTENSION COMPLICATING PREGNANCY, SECOND TRIMESTER: ICD-10-CM

## 2024-06-17 DIAGNOSIS — O09.299 HISTORY OF GESTATIONAL DIABETES MELLITUS (GDM) IN PRIOR PREGNANCY, CURRENTLY PREGNANT: ICD-10-CM

## 2024-06-17 DIAGNOSIS — O24.414 INSULIN CONTROLLED GESTATIONAL DIABETES MELLITUS (GDM) IN SECOND TRIMESTER: ICD-10-CM

## 2024-06-17 DIAGNOSIS — O09.522 MULTIGRAVIDA OF ADVANCED MATERNAL AGE IN SECOND TRIMESTER: Primary | ICD-10-CM

## 2024-06-17 PROBLEM — Z76.89 ENCOUNTER TO ESTABLISH CARE: Status: RESOLVED | Noted: 2023-10-09 | Resolved: 2024-06-17

## 2024-06-17 PROBLEM — Z87.891 HISTORY OF TOBACCO USE: Status: RESOLVED | Noted: 2023-10-09 | Resolved: 2024-06-17

## 2024-06-17 PROBLEM — R73.01 IFG (IMPAIRED FASTING GLUCOSE): Status: RESOLVED | Noted: 2023-11-22 | Resolved: 2024-06-17

## 2024-06-17 PROBLEM — O09.529 AMA (ADVANCED MATERNAL AGE) MULTIGRAVIDA 35+: Status: ACTIVE | Noted: 2024-06-17

## 2024-06-17 PROBLEM — R79.82 CRP ELEVATED: Status: RESOLVED | Noted: 2023-11-22 | Resolved: 2024-06-17

## 2024-06-17 PROBLEM — B34.9 VIRAL SYNDROME: Status: RESOLVED | Noted: 2024-01-08 | Resolved: 2024-06-17

## 2024-06-17 PROCEDURE — 1159F MED LIST DOCD IN RCRD: CPT | Mod: CPTII,S$GLB,, | Performed by: OBSTETRICS & GYNECOLOGY

## 2024-06-17 PROCEDURE — 3079F DIAST BP 80-89 MM HG: CPT | Mod: CPTII,S$GLB,, | Performed by: OBSTETRICS & GYNECOLOGY

## 2024-06-17 PROCEDURE — 99204 OFFICE O/P NEW MOD 45 MIN: CPT | Mod: S$GLB,,, | Performed by: OBSTETRICS & GYNECOLOGY

## 2024-06-17 PROCEDURE — 3074F SYST BP LT 130 MM HG: CPT | Mod: CPTII,S$GLB,, | Performed by: OBSTETRICS & GYNECOLOGY

## 2024-06-17 PROCEDURE — 76811 OB US DETAILED SNGL FETUS: CPT | Mod: S$GLB,,, | Performed by: OBSTETRICS & GYNECOLOGY

## 2024-06-17 PROCEDURE — 3008F BODY MASS INDEX DOCD: CPT | Mod: CPTII,S$GLB,, | Performed by: OBSTETRICS & GYNECOLOGY

## 2024-06-17 PROCEDURE — 1160F RVW MEDS BY RX/DR IN RCRD: CPT | Mod: CPTII,S$GLB,, | Performed by: OBSTETRICS & GYNECOLOGY

## 2024-06-17 RX ORDER — FAMOTIDINE 10 MG/1
10 TABLET ORAL 2 TIMES DAILY
COMMUNITY

## 2024-06-17 RX ORDER — LEVOCETIRIZINE DIHYDROCHLORIDE 5 MG/1
5 TABLET, FILM COATED ORAL NIGHTLY
COMMUNITY

## 2024-06-17 RX ORDER — NAPROXEN SODIUM 220 MG/1
81 TABLET, FILM COATED ORAL DAILY
COMMUNITY

## 2024-06-17 RX ORDER — OMEPRAZOLE 20 MG/1
20 TABLET, DELAYED RELEASE ORAL DAILY
COMMUNITY
End: 2024-06-20

## 2024-06-17 RX ORDER — ONDANSETRON 4 MG/1
4 TABLET, FILM COATED ORAL
COMMUNITY
Start: 2024-05-31

## 2024-06-17 RX ORDER — NAPROXEN SODIUM 220 MG
1 TABLET ORAL NIGHTLY
Qty: 30 EACH | Refills: 3 | Status: SHIPPED | OUTPATIENT
Start: 2024-06-17 | End: 2024-07-17

## 2024-06-17 RX ORDER — INSULIN HUMAN 100 [IU]/ML
INJECTION, SUSPENSION SUBCUTANEOUS
Qty: 10 ML | Refills: 3 | Status: SHIPPED | OUTPATIENT
Start: 2024-06-17 | End: 2024-06-20 | Stop reason: SDUPTHER

## 2024-06-17 RX ORDER — ENOXAPARIN SODIUM 100 MG/ML
40 INJECTION SUBCUTANEOUS DAILY
Qty: 30 EACH | Refills: 3 | Status: SHIPPED | OUTPATIENT
Start: 2024-06-17

## 2024-06-17 NOTE — PROGRESS NOTES
Maternal Fetal Medicine New Consult    Subjective:     Patient ID: [id number]    Chief Complaint: MFM Consult w/us  (For AMA/)      HPI: 39 y.o.  female  at 22w1d gestation with Estimated Date of Delivery: None noted. by early US, not consistent with LMP. She is sent for MFM consultation for AMA.     She is of advanced maternal age and will be 39 by the [facility].  She had negative cell free DNA.  She has history of postop left lower extremity DVT in 2017 following knee surgery.  She was on Xarelto for 4 months following that. She was on lovenox during her last pregnancy. She has history of insulin controlled GDM in her last pregnancy.  She had normal early 1 hour GCT, but she reports, out of caution, she has been checking her glucose.  She reports less than 120 around 2 hours after meals, but she reports fastings anywhere from 103-108, although not checking consistently.  She also had gestational hypertension in her last pregnancy, and had mild range BP around 18 weeks this pregnancy. She reports it was a very stressful day. Upon chart review, multiple BP's 130's/80's with highest 138/88 in . She  reports that she checks her blood pressure at home, and those values have been normal per her report. She is on low dose aspirin once daily.  Patient was accompanied by her  Feliz       She denies any personal or family history of aneuploidy or anomalies. She denies any exposure to high fevers, viral rashes, illicit drugs or alcohol in this pregnancy.  She denies any leaking fluid, vaginal bleeding, contractions, decreased fetal movement. Denies headaches, visual disturbances, or epigastric pain.       Pregnancy complications include:  Patient Active Problem List   Diagnosis    Obesity    AMA (advanced maternal age) multigravida 35+    At high risk for complications of intrauterine pregnancy (IUP)    History of gestational hypertension    History of deep vein thrombosis (DVT) during pregnancy    History  of gestational diabetes mellitus (GDM) in prior pregnancy, currently pregnant    Insulin controlled gestational diabetes mellitus (GDM) in second trimester    Pre-existing essential hypertension complicating pregnancy, second trimester       Past Medical History:   Diagnosis Date    Anemia     during one of her pregnancies    Asthma     dx as a child    Breast disorder     hard tissue on left breast    Deep vein thrombosis 2017    afer knee sx    DVT (deep venous thrombosis)     LLE    Gestational diabetes     [id number]    Hypertension     during 2019 pregnancy    IFG (impaired fasting glucose) 11/22/2023       Past Surgical History:   Procedure Laterality Date    KNEE SURGERY Left        Family History   Problem Relation Name Age of Onset    Diabetes Paternal Grandmother      Heart attack Maternal Grandmother      Stroke Maternal Grandmother      Hypertension Maternal Grandmother      Diabetes Maternal Grandmother      Uterine cancer Maternal Grandmother      Hypertension Maternal Grandfather      Diabetes Maternal Grandfather      Stroke Mother      Miscarriages / Stillbirths Mother      Hypertension Mother      Ovarian cancer Mother      Heart attack Mother      Diabetes Mother         Social History     Socioeconomic History    Marital status:    Tobacco Use    Smoking status: Former     Current packs/day: 1.00     Average packs/day: 1 pack/day for 13.0 years (13.0 ttl pk-yrs)     Types: Cigarettes     Passive exposure: Never    Smokeless tobacco: Never   Substance and Sexual Activity    Alcohol use: Not Currently    Drug use: Never    Sexual activity: Yes     Partners: Male       Current Outpatient Medications   Medication Sig Dispense Refill    aspirin 81 MG Chew Take 81 mg by mouth once daily.      famotidine (PEPCID) 10 MG tablet Take 10 mg by mouth 2 (two) times daily.      levocetirizine (XYZAL) 5 MG tablet Take 5 mg by mouth every evening.      omeprazole (PRILOSEC OTC) 20 MG tablet Take 20 mg by  "mouth once daily.      ondansetron (ZOFRAN) 4 MG tablet Take 4 mg by mouth.      PNV no.153/FA/om3/dha/epa/fish (PRENATAL GUMMIES ORAL) Take by mouth.      enoxaparin (LOVENOX) 40 mg/0.4 mL Syrg Inject 0.4 mLs (40 mg total) into the skin once daily. 30 each 3    insulin NPH (HUMULIN N NPH U-100 INSULIN) 100 unit/mL injection Inject 14 units NPH at Bedtime 10 mL 3    insulin syringe-needle U-100 0.5 mL 31 gauge x 5/16" Syrg 1 Syringe by Misc.(Non-Drug; Combo Route) route every evening. 30 each 3     No current facility-administered medications for this visit.       Review of patient's allergies indicates:  No Known Allergies     Review of Systems   12 point review of systems conducted, negative except as stated in the history of present illness. See HPI for details.      Objective:     Visit Vitals  /84 (BP Location: Left arm, Patient Position: Sitting, BP Method: Large (Automatic))   Pulse 85   Ht 5' 9" (1.753 m)   Wt 114.3 kg (252 lb)   LMP 01/17/2024 (Approximate)   BMI 37.21 kg/m²        Physical Exam  Vitals and nursing note reviewed.   Constitutional:       General: She is not in acute distress.     Appearance: Normal appearance.      Comments: Increased BMI   HENT:      Head: Normocephalic and atraumatic.      Nose: Nose normal. No congestion.      Mouth/Throat:      Pharynx: Oropharynx is clear.   Eyes:      General: No scleral icterus.     Conjunctiva/sclera: Conjunctivae normal.   Cardiovascular:      Rate and Rhythm: Normal rate and regular rhythm.   Pulmonary:      Effort: No respiratory distress.      Breath sounds: Normal breath sounds. No wheezing.   Abdominal:      General: Abdomen is flat.      Palpations: Abdomen is soft.      Tenderness: There is no abdominal tenderness. There is no right CVA tenderness, left CVA tenderness or guarding.      Comments: No CVA tenderness gravid uterus.    Musculoskeletal:         General: Normal range of motion.      Cervical back: Neck supple.      Right lower " leg: No edema.      Left lower leg: No edema.   Skin:     General: Skin is warm.      Findings: No bruising or rash.   Neurological:      General: No focal deficit present.      Mental Status: She is oriented to person, place, and time.      Deep Tendon Reflexes: Reflexes normal.      Comments: Normal reflexes   Psychiatric:         Mood and Affect: Mood normal.         Behavior: Behavior normal.         Thought Content: Thought content normal.         Judgment: Judgment normal.         Assessment/Plan:     39 y.o.  female with IUP at 22w1d    Advanced maternal age at 39  There is normal fetal growth and no anomalies seen on fetal anatomy scan on 24.  AFV is normal.     I discussed with her that the higher risk of aneuploidy related to advanced maternal age is caused by meiotic nondysjunction.  At this maternal  age, the risk of Down syndrome quoted at 1:148 and the risk of any chromosomal problems quoted at 1:104. She would not consider interruption of pregnancy even if anomalies or aneuploidy is detected. She was advised of the screening nature of the Level 2 ultrasound as well as the screening nature of cfDNA to check for the risk of aneuploidy with normal ultrasound and low risk cfDNA that lowers the background risk of aneuploidy.        She was counseled on Cell free DNA understanding that it is an enhanced screening test but not a diagnostic test. It assesses risk for common aneuploidies such as Trisomy 13, 18, and 21 by evaluating cell-free fetal DNA in maternal circulation with a false positive rate less than 0.5% for Trisomy 21 and less reliable for Trisomy 13 and Trisomy 18. She had cell free DNA that was negative .      She was advised that the only diagnostic test at this time is genetic amniocentesis.  Benefits and risks of a genetic amniocentesis were discussed. Patient was offered genetic amniocentesis, after thorough counseling on benefits and risks of procedure, with very remote risk of  complications and in some studies no identifiable increased risk, above background risk of spontaneous miscarriage, while some current data suggests risk up to 1 in 800 with early amniocentesis prior to 24 weeks, and remote risk of need for emergency delivery with all the complications of prematurity with amniocentesis after 24 weeks.     I also discussed with them that cell free DNA will not detect other genetic diseases or more subtle chromosomal abnormalities like microdeletions or duplications. The added benefit of doing chromosomal microarray analysis on amniotic fluid is that it would detect clinically relevant deletions or duplications in about 1:60 (1.7%) when the indication is abnormal quad screen or advanced maternal age, and 6.0 % when a structural anomaly is present. The concern about microarray analysis is that it could give uncertain findings in about 1.5-2.0% of cases that would increase anxiety and may require specialized genetic counseling.     In a recent study from 2018, clinically significant chromosomal microarray aberrations were seen in 4.7% of pregnancies with US anomalies (excluding soft markers for aneuploidy), but including patients with isolated FGR and polyhydramnios. The most common abnormality seen with cardiovascular defects was 22q11.21 deletion (DiGeorge-velocardiofacial syndrome), and the most common abnormality among genitourinary malformations was 17q12 deletion (encompassing HFNF1B). Aberrations were present in 13.1% if multiple anomalies, and around 4 % if single ultrasonographic anomaly.     She declined amniocentesis . She is aware of the need for  evaluation.    The higher risk of anomalies associated with advanced maternal age was also addressed. The reassurance obtained by the normal ultrasound and the limitations of ultrasound in checking for anomalies was explained with the need for regular  evaluations.     Fetal surveillance as below.    Delivery timing  as below.      History of postop DVT  The pregnancy itself is at a state of increased thrombophilia, with higher estrogen and progesterone levels. I have discussed with her that potential benefits outweigh risk and the risk of anticoagulation including 1-2% chance of complications including heparin induced thrombocytopenia and osteopenia was addressed, and it was agreed to start prophylactic dose of Lovenox 40 mg daily.    Current guidelines of the American Society of Regional Anest[employer] and Pain [employer], suggest that spinal anesthesia may be performed 12 hours after the last prophylactic dose of LMW Heparin, 24 hours after the last therapeutic dose (whether given daily or 2x/day), and 6 hours after the last IV heparin, with a normal PTT. Prophylactic LMW Heparin should not be started before 12 hours after the removal of the epidural catheter. Expectant management.     Delivery Management: I recommend discontinuing Lovenox 24 hour prior to scheduled procedure. Alternatively, she can switch to Heparin around 36 weeks gestation and continue until onset of labor with monitoring serial aPTT levels till therapeutic dose of Heparin achieved with use of Protamine for Heparin reversal if indicated.       History of insulin controlled GDM, with recurrent GDM  The incidence of diabetes in pregnancy is 6-7%, and about 85% represent GDM. I discussed with her risks associated with diabetes in pregnancy including higher risk for polyhydramnios, fetal macrosomia, lower Apgar scores and  metabolic complications (hypoglycemia, hyperbilirubinemia, hypocalcemia, erythema) and developing preeclampsia. With suboptimal diet control and AC measuring about 10 days ahead at this time, treatment is recommended in addition to 2200 calorie diabetic diet. It is recommended that she have 30-45 grams of carbohydrates with breakfast, a mid-morning snack with 15-30 grams of carbohydrates, 45-60 grams of carbohydrates with lunch, a  mid-afternoon snack with 15-30 grams of carbohydrates, 45-60 grams of carbohydrates with dinner, and a bedtime snack with 15-30 grams of carbohydrates. The importance of avoiding any significant weight gain till the end of the pregnancy was discussed.      I have shared with her the options of treatment including insulin treatment which is the gold standard of care for diabetes in pregnancy versus a recent use of glyburide or metformin as alternatives. Although, glyburide has been used over the past 10 years as primary alternative to insulin, a recent meta-analysis (2015) suggested that metformin should be the alternative to insulin and not glyburide. The conclusion of the study showed a higher birth weight (100 g) associated with glyburide use compared to insulin, two fold higher risk of  hypoglycemia, and more than 2x higher risk of macrosomia associated with glyburide use. This difference is likely to be secondary to hyperinsulinism in fetus secondary to fetal exposure to glyburide.  Metformin, also had lower maternal weight gain, lower birth rate and less risk of macrosomia when compared with glyburide. When compared to insulin, Metformin had lower maternal weight gain, increased  birth and lower gestational age at delivery and lower incidence of gestational hypertension. There was a trend for less  hypoglycemia and high failure rate of 30-35%, when compared to insulin.  In addition, the lack of long term data on glyburide and metformin use regarding safety was addressed and recommended use of Insulin for treatment, which is the gold standard, with excellent efficiency and safety, albeit more inconvenience.  Questions were answered.                  Log reviewed. After counseling on Insulin use and alternatives of metformin and glyburide with their benefits and risks, agreed to start insulin 14 units of NPH at bedtime. With insulin use, there is risk for hypoglycemia. I discussed symptoms  of hypoglycemia with recommendation to assess blood sugar then eat something high in sugar. I informed her that the best way to decrease episodes of low blood sugar is well balanced healthy diabetic diet with appropriate snacks between meals. Brochures given. Questions answered.     The patient was instructed to check blood sugar four times per day and call me in a few days with her blood sugars to make further adjustments of dose of Insulin. The goal of treatment is to keep pre-prandial blood sugars below 90 and one hour postprandial below 140.     Fetal testing could be started in this setting around 32 weeks gestation, weekly or twice weekly depending on glucose control and additional comorbidities. If additional complications occur, then closer fetal surveillance will be needed. She was advised to do fetal kick counts 3 times daily and PRN after 24 weeks, with immediate reporting of decreased fetal movements (<10 movements/hr).     Delivery timing as below.    The association of gestational diabetes (50%) with adult-onset type 2 diabetes mellitus was reviewed.  She was advised of importance of losing weight after the pregnancy, as well as doing a 75g 2hr glucose tolerance test after postpartum exam, and checkups every 1-3 years for blood sugars to make sure she does not develop diabetes.        History of gestational hypertension in previous pregnancy, with borderline CHTN  With several BP's >130/80, this is consistent with borderline CHTN    Chronic hypertension complicates up to 2-5% of pregnancies and is associated with significant adverse pregnancy outcomes including  birth, fetal growth restriction, fetal demise, placental abruption, and  delivery. Recent data suggest higher risk of certain congenital anomalies, including, heart defects, hypospadias and esophageal atresia. The incidence of adverse outcomes seen in pregnancies complicated by chronic hypertension is related to the degree and  "duration of hypertension and to the association of other organ system involvement or damage. Most pregnant women with mild chronic hypertension have uneventful pregnancies with no end-organ involvement. Comparing women who developed superimposed preeclampsia with women who did not, the incidence of  birth was 100% versus 38%, the incidence of small-for-gestational-age (SGA) infants was 78% versus 15%, and the  mortality rate was 48% versus 0%. Besides superimposed preeclampsia or eclampsia, pregnancy complicated by chronic hypertension (especially if severe) may be associated with worsening or malignant hypertension, central nervous system hemorrhage, cardiac decompensation, and renal deterioration or failure.    The age of onset, results of previous evaluation, severity and duration of hypertension, and physical examination are important determinants of which clinical tests may be useful. Ideally, a woman with chronic hypertension should be evaluated before pregnancy to ascertain potentially reversible causes and end-organ involvement (eg, heart or kidney). Baseline laboratory tests may include serum creatinine, blood urea nitrogen, electrolytes, CBC and 24-hour urine evaluation for total protein and creatinine clearance. Women who have had hypertension for several years are more likely to have cardiomegaly, ischemic heart disease, renal involvement, and retinopathy. In patients with severe disease over 4 years, or long standing hypertension (typically if over 30 years old), tests which may prove useful in the evaluation of these women include electrocardiography, echocardiography, ophthalmologic examination, and renal ultrasonography.     Women with paroxysmal hypertension, frequent "hypertensive crisis," seizure disorders, or anxiety attacks should be evaluated for pheochromocytoma with measurements of 24-hour urine vanillylmandelic acid, metanephrines, or unconjugated catecholamines. Magnetic " resonance imaging after the first trimester or computed tomography may be useful for adrenal tumor localization. Renal artery stenosis appears to be more prevalent in patients with type-2 diabetes and coexistent hypertension. Doppler flow studies or magnetic resonance angiography are helpful to detect renal artery stenosis. Negative results from renal ultrasonography do not exclude renal artery stenosis.     In women with chronic hypertension, it can be difficult to discern worsening hypertension that might occur as a result of physiological changes of pregnancy in the third trimester from the development of preeclampsia. The use of certain laboratory tests such as serum creatinine, liver functions tests, hematocrit and platelets to compare to baseline values from early in pregnancy might serve to delineate these conditions. Routine screening of women with chronic hypertension with these laboratory tests is not routinely indicated.    I have shared with her the normal natural course of chronic hypertension in pregnancy with improvement early on and likely increasing blood pressure as the pregnancy advances. Based on findings of recent CHAP Study, it is recommended to utilize 140/90 as the threshold for initiation or titration of medical therapy for chronic hypertension in pregnancy, rather than the previously recommended threshold of 160/110. For patients on blood pressure medications at the start of pregnancy, in the absence of mitigating factors or side effects, they can be maintained on their medications, rather than discontinuing them and waiting to initiate treatment for blood pressures in the severe range. Commonly used medications include nifedipine and labetalol.    BP Readings from Last 1 Encounters:   06/17/24 124/84    With this blood pressure, she was advised to follow low sodium diet with no medication at this time.      Use aspirin as discussed.    She was advised of the higher risk of fetal growth  restriction and superimposed preeclampsia with her underlying chronic hypertension. The risk of placental abruption was also discussed. No prevention is available with her reporting any bleeding or cramping. She was also advised to report any headaches, visual problems, epigastric pain.    There is no consensus as to the most appropriate fetal surveillance test(s) or the interval and timing of testing in  with chronic hypertension. Thus, such testing should be individualized and based on clinical judgment and on severity of disease.    Fetal surveillance as above    Among patients with uncomplicated chronic hypertension with no additional risk factors, delivery at 38-39 weeks appears to provide optimal balance between the risk of adverse fetal and  outcomes. However, with multiple comorbidities, will reassess closer to EDC to determine optimal timeframe or GA for delivery, based on current evaluation at that time.    At risk for preeclampsia  With her risk factors for preeclampsia including preeclampsia/GHTN in a previous pregnancy and chronic hypertension, BMI over 30 and maternal age 35 years or older, discussed recommendations for low dose aspirin use to decrease risks for adverse outcomes, including preeclampsia, low birth rate and  delivery. Low-dose aspirin reduced the risk for preeclampsia by 15% in clinical trials and reduced the risk for  birth by 20% and FGR by 20%, and  mortality by around 20%.  After discussing risks/benefits of its use, it was agreed to continue asa 81 mg daily until delivery. Also, recommend using in all future pregnancies.         Genetic counseling for advanced maternal age discussed.  Patient had a negative cell free DNA.  The options of doing an amniocentesis discussed.  Explained to the patient that the amniocentesis to be 100% conclusive but will also check for micro deletions duplications that the cell free DNA does not check for.  Patient  is decided not to have an amniocentesis.  Patient's history with elevated blood pressures is consistent with mild underlying hypertension especially with increased BMI and multiparity.  Patient will do low-salt diet.  No antihypertensive treatment is needed at this time.  Fetal kick count and preeclampsia warnings.  Continue daily aspirin.  With fasting blood sugars elevated and previous need for treatment in previous pregnancy with accelerated fetal growth, and with the abdominal circumference 10 days ahead of gestational age at this time with the elevated fasting blood sugars, reviewed with her the different alternatives of treatment and the options of continued expectant management and agreed to follow diabetic diet, exercise, and start 14 units of NPH at bedtime.  Patient will be calling her sugars later this week and next week and will see her back in 2 weeks for a telehealth visit.  Patient was advised that there is no clear guidelines of whether to treat with Lovenox or not to treat at this time.  The advantage of treating to decrease the risk of DVT had to be weighed against the risks and patient is more comfortable with taking a prophylactic dose of Lovenox.  Recommend having a platelet count done 1 week and 2 weeks from now to make sure that low thrombocytopenia related to the Lovenox occurs which is already very low risk.  We will send the order for to CBC is to be done 1 in 2 weeks at the place of patient's preference. Patient's and her 's questions were answered.  They are in agreement with plan of care.      Follow up in about 4 weeks (around 7/15/2024) for MFM Followup, Repeat Ultrasound, to complete anatomy (2 week telehealth visit).     Future Appointments   Date Time Provider Department Center   7/18/2024  9:00 AM Ananth Tom MD [facility]   7/18/2024  9:15 AM ROOM 3, [facility]          Patient was evaluated by Ivy Alarcon, GLORIA, and and   Vaishali.  Final assessment and recommendations as stated above were made by Dr. Tom.    This note was created with the assistance of [employer] voice recognition software. There may be transcription errors as a result of using this technology, however minimal. Effort has been made to ensure accuracy of transcription, but any obvious errors or omissions should be clarified with the author of the document.

## 2024-06-20 ENCOUNTER — PATIENT MESSAGE (OUTPATIENT)
Dept: MATERNAL FETAL MEDICINE | Facility: CLINIC | Age: 39
End: 2024-06-20
Payer: COMMERCIAL

## 2024-06-20 DIAGNOSIS — O24.414 INSULIN CONTROLLED GESTATIONAL DIABETES MELLITUS (GDM) IN SECOND TRIMESTER: Primary | ICD-10-CM

## 2024-06-20 PROBLEM — E66.01 SEVERE OBESITY (BMI 35.0-39.9) WITH COMORBIDITY: Status: ACTIVE | Noted: 2023-10-09

## 2024-06-20 PROBLEM — M79.671 RIGHT FOOT PAIN: Status: ACTIVE | Noted: 2024-06-20

## 2024-06-20 RX ORDER — INSULIN LISPRO 100 [IU]/ML
INJECTION, SOLUTION INTRAVENOUS; SUBCUTANEOUS
Qty: 10 ML | Refills: 3 | Status: SHIPPED | OUTPATIENT
Start: 2024-06-20

## 2024-06-20 RX ORDER — INSULIN HUMAN 100 [IU]/ML
INJECTION, SUSPENSION SUBCUTANEOUS
Qty: 10 ML | Refills: 3 | Status: SHIPPED | OUTPATIENT
Start: 2024-06-20

## 2024-06-20 RX ORDER — SYRINGE,SAFETY WITH NEEDLE,1ML 25GX1"
SYRINGE (EA) MISCELLANEOUS
Qty: 60 EACH | Refills: 3 | Status: SHIPPED | OUTPATIENT
Start: 2024-06-20

## 2024-06-28 PROBLEM — O99.212 SEVERE OBESITY DUE TO EXCESS CALORIES AFFECTING PREGNANCY IN SECOND TRIMESTER: Status: ACTIVE | Noted: 2023-10-09

## 2024-06-28 NOTE — PROGRESS NOTES
Maternal Fetal Medicine Follow Up Via Telemedicine    The patient location is:  Home  The chief complaint leading to consultation is:  Advanced maternal age, chronic hypertension and gestational diabetes mellitus    Visit type: audiovisual    Face to Face time with patient: 9 min    Each patient to whom he or she provides medical services by telemedicine is:  (1) informed of the relationship between the physician and patient and the respective role of any other health care provider with respect to management of the patient; and (2) notified that he or she may decline to receive medical services by telemedicine and may withdraw from such care at any time.    Notes:       Subjective:     Patient ID: [id number]    Chief Complaint: mfm followup w/us      HPI: Maggie St is a 39 y.o. female  at 24w1d gestation with Estimated Date of Delivery: 10/20/24  who is here for follow-up consultation by [facility].    She is of advanced maternal age and will be 39 by the [facility].  She had negative cell free DNA and declined amniocentesis.  She has history of postop left lower extremity DVT in 2017 following knee surgery.  She was on Xarelto for 4 months following that. She was on lovenox during her last pregnancy.  She is currently on prophylactic Lovenox 40 mg daily.  She has history of insulin controlled GDM in her last pregnancy and now has recurrent GDM. She is on insulin, 14 units of NPH and 6 units of Humalog in the morning, 10 units of Humalog for supper, and 32 units of NPH at bedtime..  Patient however was taking the NPH at and 10 units of the Humalog at bedtime together.  She also had gestational hypertension in her last pregnancy, and had mild range BP around 18 weeks this pregnancy. She reports it was a very stressful day.  She has mild chronic hypertension diagnosed in .  She is on no antihypertensives.  She is on low dose aspirin once daily.  She has increased BMI of 38 the at the initial MFM consult  "visit.    Patient has no complaints and reports good fetal movement today.    Patient reported that she slipped on June 25 and fell on her knee.  She did not bump her abdomen.  There was no cramping or bleeding.  Her knees hurting.       Interval history since last [facility] visit: None.. She denies any leaking fluid, vaginal bleeding, contractions, decreased fetal movement. Denies headaches, visual disturbances, or epigastric pain.    Pregnancy complications include:   Patient Active Problem List   Diagnosis    Increased BMI over 35 affecting pregnancy in second trimester    AMA (advanced maternal age) multigravida 35+    At high risk for complications of intrauterine pregnancy (IUP)    History of gestational hypertension    History of deep vein thrombosis (DVT) during pregnancy    History of gestational diabetes mellitus (GDM) in prior pregnancy, currently pregnant    Insulin controlled gestational diabetes mellitus (GDM) in second trimester    Pre-existing essential hypertension complicating pregnancy, second trimester    Right foot pain       No changes to medical, surgical, family, social, or obstetric history.    Medications:  Current Outpatient Medications   Medication Instructions    aspirin 81 mg, Oral, Daily    enoxaparin (LOVENOX) 40 mg, Subcutaneous, Daily    famotidine (PEPCID) 10 mg, Oral, 2 times daily    insulin lispro (HUMALOG U-100 INSULIN) 100 unit/mL injection INJECT 8 UNITS IN AM    insulin NPH (HUMULIN N NPH U-100 INSULIN) 100 unit/mL injection INJECT 14 UNITS IN AM AND 22 UNITS AT BEDTIME    insulin syringe-needle U-100 0.5 mL 31 gauge x 5/16" Syrg 1 Syringe, Misc.(Non-Drug; Combo Route), Nightly    insulin syringe-needle U-100 1 mL 31 gauge x 5/16 Syrg Use as directed to inject insulin.    levocetirizine (XYZAL) 5 mg, Oral, Nightly    omeprazole (PRILOSEC) 20 mg, Oral, Daily    ondansetron (ZOFRAN) 4 mg, Oral    PNV no.153/FA/om3/dha/epa/fish (PRENATAL GUMMIES ORAL) Oral    TRUEPLUS INSULIN 0.3 mL " "31 gauge x /16" Syrg SMARTSIG:Injection As Directed       Review of Systems   12 point review of systems conducted, negative except as stated in the history of present illness. See HPI for details.      Objective:     Visit Vitals  LMP 2024 (Approximate)        Physical Exam  Vitals and nursing note reviewed.   Constitutional:       Appearance: Normal appearance.      Comments: Increased BMI   HENT:      Head: Normocephalic and atraumatic.      Nose: Nose normal. No congestion.   Cardiovascular:      Rate and Rhythm: Normal rate.   Pulmonary:      Effort: Pulmonary effort is normal.   Skin:     Findings: No rash.   Neurological:      Mental Status: She is alert and oriented to person, place, and time.   Psychiatric:         Mood and Affect: Mood normal.         Behavior: Behavior normal.         Thought Content: Thought content normal.         Judgment: Judgment normal.         Assessment/Plan:     39 y.o.  female with IUP at 24w1d    Advanced maternal age at 39  There was normal fetal growth and no anomalies seen on fetal anatomy scan on 24.    Good fetal movement per patient.    Reviewed risks with AMA, including risks for PTL, FGR, anomalies not seen, aneuploidy and stillbirth at term. She previously declined amniocentesis . She is aware of need for  evaluation. She previously had cell free DNA that was negative .    Fetal surveillance as below.    Delivery timing as below.      History of postop DVT  Advised patient to continue Lovenox 40 mg daily.    Current guidelines of the American Society of Regional Anesthesia and Pain Medicine, suggest that spinal anesthesia may be performed 12 hours after the last prophylactic dose of LMW Heparin, 24 hours after the last therapeutic dose (whether given daily or 2x/day), and 6 hours after the last IV heparin, with a normal PTT. Prophylactic LMW Heparin should not be started before 12 hours after the removal of the epidural catheter. Expectant " management.    Delivery Management: I recommend discontinuing Lovenox 24 hour prior to scheduled induction. Alternatively, she can switch to Heparin around 36 weeks gestation.  However if prophylaxis is used with a higher prophylaxis [id number] units every 12 hours recommended close to delivery, regional anesthesia could not be used with a high dose until 12 hours after, making limited benefit of heparin over prophylactic Lovenox.  If full anticoagulation is being use, consideration for switching to heparin until onset of labor with monitoring serial aPTT levels till therapeutic dose of Heparin achieved with use of Protamine for Heparin reversal if indicated.  The need for close monitoring of PTT, risk of under or over anticoagulation during the transition, should be weighed against the potential benefits.           Recurrent GDM  Risks associated with diabetes in pregnancy include higher risk for polyhydramnios, fetal macrosomia, lower Apgar scores and  metabolic complications (hypoglycemia, hyperbilirubinemia, hypocalcemia, erythema) and developing preeclampsia.     Continue 2200 calorie diabetic diet during pregnancy.                  Log reviewed. Patient advised to adjust dose of insulin to 18 units 1of NPH and 6 units of Humalog in the morning, 8 units of Humalog for supper, and 40 units of NPH at bedtime.     With all blood sugars being in a similar range, She was advised to check glucose 2 times a day, alternating morning and afternoon sugars, and send log/call with values weekly, and bring log to each visit. The goal of treatment is to keep pre-prandial blood sugars below 90 and one hour postprandial below 140.     Fetal testing could be started in this setting around 32 weeks gestation, weekly or twice weekly depending on glucose control and additional comorbidities. She was advised to continue fetal kick counts 3 times daily and PRN, with immediate reporting of decreased fetal movements (<10  movements/hr).     The association of gestational diabetes (50%) with adult-onset type 2 diabetes mellitus was reviewed.  She was advised of importance of losing weight after the pregnancy, as well as doing a 75g 2hr glucose tolerance test after postpartum exam, and checkups every 1-3 years for blood sugars to make sure she does not develop diabetes.         Borderline CHTN  Chronic hypertension is associated with significant adverse pregnancy outcomes including  birth, fetal growth restriction, fetal demise, placental abruption, and  delivery. Based on findings of recent CHAP Study, it is recommended to utilize 140/90 as the threshold for initiation or titration of medical therapy for chronic hypertension in pregnancy, rather than the previously recommended threshold of 160/110. For patients on blood pressure medications at the start of pregnancy, in the absence of mitigating factors or side effects, they can be maintained on their medications, rather than discontinuing them and waiting to initiate treatment for blood pressures in the severe range.    BP Readings from Last 1 Encounters:   24 130/82     With this BP, she was advised to continue low salt diet.     Low dose aspirin as discussed.    Patient will keep her follow-up appointment about 2 weeks to complete anatomy scan then we will probably repeat an ultrasound around 30 weeks after that, then every 4 weeks until the end of pregnancy. Recommend fetal testing starting around 32 weeks gestation until the end of pregnancy.    Among patients with uncomplicated chronic hypertension with no additional risk factors, delivery at 38-39 weeks appears to provide optimal balance between the risk of adverse fetal and  outcomes. If no medication and normal fetal assessment, recommend delivery at 39 weeks. However, will reassess closer to EDC to determine optimal timeframe or GA for delivery, based on current evaluation at that time.         Increased BMI over 35  There is no height or weight on file to calculate BMI. With continue healthy low caloric and low salt diet, she was advised to continue a healthy low caloric diet, low-salt and diabetic diet. Excess weight gain would be associated with gestational hypertension, gestational diabetes and adverse  outcomes, including fetal demise in utero.    It is important to lose weight after the pregnancy is over, especially before a future pregnancy was discussed. Breastfeeding may be an important tool in reducing the postpartum weight retention. Fetal risks were discussed with short term risk of fetal/ obesity and long term risk of adolescent component of metabolic syndrome.    With elevated BMI, fetal testing as discussed elsewhere. She was advised to continue fetal kick counts 3 times daily and PRN, with immediate reporting of decreased fetal movements (<10 movements/hr).       History of gestational hypertension  With increased risk for recurrence, it was agreed to continue asa 81 mg daily until delivery.. Preeclampsia precautions reviewed.     Supportive care measures for her knee pain after the fall discussed.  If worse have that evaluated by primary OB.    No follow-ups on file.     Future Appointments   Date Time Provider Department Center   2024  9:30 AM Ananth Tom MD [facility]   2024  9:30 AM ROOM 1, [facility]        TODD involvement: Patient was evaluated and examined by Dr. Tom. ROMARIO Collado, helped in pre charting of part of note.    This note was created with the assistance of [employer] voice recognition software. There may be transcription errors as a result of using this technology, however minimal. Effort has been made to ensure accuracy of transcription, but any obvious errors or omissions should be clarified with the author of the document.

## 2024-07-01 ENCOUNTER — OFFICE VISIT (OUTPATIENT)
Dept: MATERNAL FETAL MEDICINE | Facility: CLINIC | Age: 39
End: 2024-07-01
Payer: COMMERCIAL

## 2024-07-01 ENCOUNTER — PATIENT MESSAGE (OUTPATIENT)
Dept: MATERNAL FETAL MEDICINE | Facility: CLINIC | Age: 39
End: 2024-07-01

## 2024-07-01 DIAGNOSIS — O99.212 SEVERE OBESITY DUE TO EXCESS CALORIES AFFECTING PREGNANCY IN SECOND TRIMESTER: ICD-10-CM

## 2024-07-01 DIAGNOSIS — O24.414 INSULIN CONTROLLED GESTATIONAL DIABETES MELLITUS (GDM) IN SECOND TRIMESTER: ICD-10-CM

## 2024-07-01 DIAGNOSIS — O09.522 MULTIGRAVIDA OF ADVANCED MATERNAL AGE IN SECOND TRIMESTER: ICD-10-CM

## 2024-07-01 DIAGNOSIS — Z86.718 HISTORY OF DEEP VEIN THROMBOSIS (DVT) DURING PREGNANCY: ICD-10-CM

## 2024-07-01 DIAGNOSIS — Z87.59 HISTORY OF DEEP VEIN THROMBOSIS (DVT) DURING PREGNANCY: ICD-10-CM

## 2024-07-01 DIAGNOSIS — O10.012 PRE-EXISTING ESSENTIAL HYPERTENSION COMPLICATING PREGNANCY, SECOND TRIMESTER: Primary | ICD-10-CM

## 2024-07-01 DIAGNOSIS — O09.299 HISTORY OF GESTATIONAL DIABETES MELLITUS (GDM) IN PRIOR PREGNANCY, CURRENTLY PREGNANT: ICD-10-CM

## 2024-07-01 DIAGNOSIS — E66.01 SEVERE OBESITY DUE TO EXCESS CALORIES AFFECTING PREGNANCY IN SECOND TRIMESTER: ICD-10-CM

## 2024-07-01 DIAGNOSIS — Z86.32 HISTORY OF GESTATIONAL DIABETES MELLITUS (GDM) IN PRIOR PREGNANCY, CURRENTLY PREGNANT: ICD-10-CM

## 2024-07-01 DIAGNOSIS — Z87.59 HISTORY OF GESTATIONAL HYPERTENSION: ICD-10-CM

## 2024-07-01 PROCEDURE — 1160F RVW MEDS BY RX/DR IN RCRD: CPT | Mod: CPTII,95,, | Performed by: OBSTETRICS & GYNECOLOGY

## 2024-07-01 PROCEDURE — 1159F MED LIST DOCD IN RCRD: CPT | Mod: CPTII,95,, | Performed by: OBSTETRICS & GYNECOLOGY

## 2024-07-01 PROCEDURE — 99214 OFFICE O/P EST MOD 30 MIN: CPT | Mod: 95,,, | Performed by: OBSTETRICS & GYNECOLOGY

## 2024-07-01 RX ORDER — SYRING-NEEDL,DISP,INSUL,0.3 ML 30 GX5/16"
SYRINGE, EMPTY DISPOSABLE MISCELLANEOUS
COMMUNITY
Start: 2024-06-20

## 2024-07-01 RX ORDER — OMEPRAZOLE 20 MG/1
20 CAPSULE, DELAYED RELEASE ORAL DAILY
COMMUNITY

## 2024-07-09 ENCOUNTER — PATIENT MESSAGE (OUTPATIENT)
Dept: MATERNAL FETAL MEDICINE | Facility: CLINIC | Age: 39
End: 2024-07-09
Payer: COMMERCIAL

## 2024-07-09 DIAGNOSIS — O24.414 INSULIN CONTROLLED GESTATIONAL DIABETES MELLITUS (GDM) IN SECOND TRIMESTER: ICD-10-CM

## 2024-07-09 RX ORDER — INSULIN HUMAN 100 [IU]/ML
INJECTION, SUSPENSION SUBCUTANEOUS
Qty: 20 ML | Refills: 3 | Status: SHIPPED | OUTPATIENT
Start: 2024-07-09 | End: 2024-07-09 | Stop reason: SDUPTHER

## 2024-07-09 RX ORDER — INSULIN HUMAN 100 [IU]/ML
INJECTION, SUSPENSION SUBCUTANEOUS
Qty: 20 ML | Refills: 3 | Status: SHIPPED | OUTPATIENT
Start: 2024-07-09

## 2024-07-15 DIAGNOSIS — Z36.2 ENCOUNTER FOR FOLLOW-UP ULTRASOUND OF FETAL ANATOMY: ICD-10-CM

## 2024-07-15 DIAGNOSIS — O24.414 INSULIN CONTROLLED GESTATIONAL DIABETES MELLITUS (GDM) IN SECOND TRIMESTER: ICD-10-CM

## 2024-07-15 DIAGNOSIS — O09.522 MULTIGRAVIDA OF ADVANCED MATERNAL AGE IN SECOND TRIMESTER: ICD-10-CM

## 2024-07-15 DIAGNOSIS — O10.012 PRE-EXISTING ESSENTIAL HYPERTENSION COMPLICATING PREGNANCY, SECOND TRIMESTER: Primary | ICD-10-CM

## 2024-07-17 ENCOUNTER — OFFICE VISIT (OUTPATIENT)
Dept: MATERNAL FETAL MEDICINE | Facility: CLINIC | Age: 39
End: 2024-07-17
Payer: COMMERCIAL

## 2024-07-17 ENCOUNTER — PROCEDURE VISIT (OUTPATIENT)
Dept: MATERNAL FETAL MEDICINE | Facility: CLINIC | Age: 39
End: 2024-07-17
Payer: COMMERCIAL

## 2024-07-17 ENCOUNTER — HOSPITAL ENCOUNTER (OUTPATIENT)
Dept: RADIOLOGY | Facility: CLINIC | Age: 39
Discharge: HOME | End: 2024-07-17
Attending: ORTHOPAEDIC SURGERY
Payer: COMMERCIAL

## 2024-07-17 ENCOUNTER — OFFICE VISIT (OUTPATIENT)
Dept: ORTHOPEDICS | Facility: CLINIC | Age: 39
End: 2024-07-17
Payer: COMMERCIAL

## 2024-07-17 ENCOUNTER — PATIENT MESSAGE (OUTPATIENT)
Dept: MATERNAL FETAL MEDICINE | Facility: CLINIC | Age: 39
End: 2024-07-17

## 2024-07-17 ENCOUNTER — DOCUMENTATION ONLY (OUTPATIENT)
Dept: MATERNAL FETAL MEDICINE | Facility: CLINIC | Age: 39
End: 2024-07-17
Payer: COMMERCIAL

## 2024-07-17 VITALS
HEART RATE: 108 BPM | HEIGHT: 69 IN | WEIGHT: 257.94 LBS | DIASTOLIC BLOOD PRESSURE: 83 MMHG | BODY MASS INDEX: 38.2 KG/M2 | SYSTOLIC BLOOD PRESSURE: 138 MMHG

## 2024-07-17 VITALS
BODY MASS INDEX: 38.36 KG/M2 | WEIGHT: 259 LBS | SYSTOLIC BLOOD PRESSURE: 111 MMHG | DIASTOLIC BLOOD PRESSURE: 72 MMHG | HEIGHT: 69 IN | HEART RATE: 84 BPM

## 2024-07-17 DIAGNOSIS — O09.522 MULTIGRAVIDA OF ADVANCED MATERNAL AGE IN SECOND TRIMESTER: ICD-10-CM

## 2024-07-17 DIAGNOSIS — O24.414 INSULIN CONTROLLED GESTATIONAL DIABETES MELLITUS (GDM) IN SECOND TRIMESTER: ICD-10-CM

## 2024-07-17 DIAGNOSIS — O10.012 PRE-EXISTING ESSENTIAL HYPERTENSION COMPLICATING PREGNANCY, SECOND TRIMESTER: Primary | ICD-10-CM

## 2024-07-17 DIAGNOSIS — S82.132D CLOSED FRACTURE OF MEDIAL PORTION OF LEFT TIBIAL PLATEAU WITH ROUTINE HEALING, SUBSEQUENT ENCOUNTER: ICD-10-CM

## 2024-07-17 DIAGNOSIS — Z86.32 HISTORY OF GESTATIONAL DIABETES MELLITUS (GDM) IN PRIOR PREGNANCY, CURRENTLY PREGNANT: ICD-10-CM

## 2024-07-17 DIAGNOSIS — Z87.59 HISTORY OF DEEP VEIN THROMBOSIS (DVT) DURING PREGNANCY: ICD-10-CM

## 2024-07-17 DIAGNOSIS — Z86.718 HISTORY OF DEEP VEIN THROMBOSIS (DVT) DURING PREGNANCY: ICD-10-CM

## 2024-07-17 DIAGNOSIS — Z36.2 ENCOUNTER FOR FOLLOW-UP ULTRASOUND OF FETAL ANATOMY: ICD-10-CM

## 2024-07-17 DIAGNOSIS — S82.132D CLOSED FRACTURE OF MEDIAL PORTION OF LEFT TIBIAL PLATEAU WITH ROUTINE HEALING, SUBSEQUENT ENCOUNTER: Primary | ICD-10-CM

## 2024-07-17 DIAGNOSIS — O09.299 HISTORY OF GESTATIONAL DIABETES MELLITUS (GDM) IN PRIOR PREGNANCY, CURRENTLY PREGNANT: ICD-10-CM

## 2024-07-17 DIAGNOSIS — E66.01 SEVERE OBESITY DUE TO EXCESS CALORIES AFFECTING PREGNANCY IN SECOND TRIMESTER: ICD-10-CM

## 2024-07-17 DIAGNOSIS — O10.012 PRE-EXISTING ESSENTIAL HYPERTENSION COMPLICATING PREGNANCY, SECOND TRIMESTER: ICD-10-CM

## 2024-07-17 DIAGNOSIS — Z87.59 HISTORY OF GESTATIONAL HYPERTENSION: ICD-10-CM

## 2024-07-17 DIAGNOSIS — O09.90 AT HIGH RISK FOR COMPLICATIONS OF INTRAUTERINE PREGNANCY (IUP): ICD-10-CM

## 2024-07-17 DIAGNOSIS — O99.212 SEVERE OBESITY DUE TO EXCESS CALORIES AFFECTING PREGNANCY IN SECOND TRIMESTER: ICD-10-CM

## 2024-07-17 PROBLEM — M79.671 RIGHT FOOT PAIN: Status: RESOLVED | Noted: 2024-06-20 | Resolved: 2024-07-17

## 2024-07-17 PROCEDURE — 73560 X-RAY EXAM OF KNEE 1 OR 2: CPT | Mod: LT,,, | Performed by: ORTHOPAEDIC SURGERY

## 2024-07-17 PROCEDURE — 1159F MED LIST DOCD IN RCRD: CPT | Mod: CPTII,S$GLB,, | Performed by: OBSTETRICS & GYNECOLOGY

## 2024-07-17 PROCEDURE — 3078F DIAST BP <80 MM HG: CPT | Mod: CPTII,S$GLB,, | Performed by: OBSTETRICS & GYNECOLOGY

## 2024-07-17 PROCEDURE — 3079F DIAST BP 80-89 MM HG: CPT | Mod: CPTII,,, | Performed by: ORTHOPAEDIC SURGERY

## 2024-07-17 PROCEDURE — 3008F BODY MASS INDEX DOCD: CPT | Mod: CPTII,S$GLB,, | Performed by: OBSTETRICS & GYNECOLOGY

## 2024-07-17 PROCEDURE — 3074F SYST BP LT 130 MM HG: CPT | Mod: CPTII,S$GLB,, | Performed by: OBSTETRICS & GYNECOLOGY

## 2024-07-17 PROCEDURE — 99214 OFFICE O/P EST MOD 30 MIN: CPT | Mod: 25,S$GLB,, | Performed by: OBSTETRICS & GYNECOLOGY

## 2024-07-17 PROCEDURE — 1160F RVW MEDS BY RX/DR IN RCRD: CPT | Mod: CPTII,S$GLB,, | Performed by: OBSTETRICS & GYNECOLOGY

## 2024-07-17 PROCEDURE — 76816 OB US FOLLOW-UP PER FETUS: CPT | Mod: S$GLB,,, | Performed by: OBSTETRICS & GYNECOLOGY

## 2024-07-17 PROCEDURE — 3075F SYST BP GE 130 - 139MM HG: CPT | Mod: CPTII,,, | Performed by: ORTHOPAEDIC SURGERY

## 2024-07-17 PROCEDURE — 99213 OFFICE O/P EST LOW 20 MIN: CPT | Mod: ,,, | Performed by: ORTHOPAEDIC SURGERY

## 2024-07-17 PROCEDURE — 3008F BODY MASS INDEX DOCD: CPT | Mod: CPTII,,, | Performed by: ORTHOPAEDIC SURGERY

## 2024-07-17 NOTE — PROGRESS NOTES
Subjective:       Patient ID: Maggie St is a 39 y.o. female.    Chief Complaint   Patient presents with    Left Knee - Pain     6.5 yrs, LT knee pain, prior sx 8/29/17 ORIF LT Tib plateau fx, pt states she slipped and fell causing knee pain, states was unable put any weight on leg for couple days, reports some improvement since, states has sharp pain when bending knee, has been taking otc tylenol for relief, reports warm to touch         Patient is here today for an evaluation of her left knee she states she had a slip and fall couple of weeks ago and has had some increasing pain.  She is now over 6 years status post open reduction internal fixation of left depressed lateral tibial plateau fracture.  She has some prominence over the lateral aspect of the knee which has been present for quite some time, nothing acute has changed.  She states the knee feels a little more warm and swollen she did feel a pop whenever she had a fall.  She is currently pregnant and has been unable to take any anti-inflammatory medications.  She has a history of DVT in the left lower extremity is currently on Lovenox low-dose baby aspirin daily.  She reports that the pain is improving now she was able to walk much better, she had considered cancelling the appointment however she wanted to have it checked out to make sure nothing had been damaged in her knee.    Pain  Pertinent negatives include no abdominal pain, chest pain, chills, congestion, coughing, fever, nausea, neck pain, numbness or vomiting.       Review of Systems   Constitutional: Negative for chills, fever and malaise/fatigue.   HENT:  Negative for congestion and hearing loss.    Eyes:  Negative for visual disturbance.   Cardiovascular:  Negative for chest pain and syncope.   Respiratory:  Negative for cough and shortness of breath.    Hematologic/Lymphatic: Does not bruise/bleed easily.   Skin:  Negative for color change and suspicious lesions.   Musculoskeletal:   "Negative for falls and neck pain.   Gastrointestinal:  Negative for abdominal pain, nausea and vomiting.   Genitourinary:  Negative for dysuria and hematuria.   Neurological:  Negative for numbness and sensory change.   Psychiatric/Behavioral:  Negative for altered mental status. The patient is not nervous/anxious.         Current Outpatient Medications on File Prior to Visit   Medication Sig Dispense Refill    aspirin 81 MG Chew Take 81 mg by mouth once daily.      enoxaparin (LOVENOX) 40 mg/0.4 mL Syrg Inject 0.4 mLs (40 mg total) into the skin once daily. 30 each 3    famotidine (PEPCID) 10 MG tablet Take 10 mg by mouth 2 (two) times daily.      insulin lispro (HUMALOG U-100 INSULIN) 100 unit/mL injection INJECT 8 UNITS IN AM 10 mL 3    insulin NPH (HUMULIN N NPH U-100 INSULIN) 100 unit/mL injection INJECT 18 UNITS IN AM AND 48 UNITS AT BEDTIME 20 mL 3    insulin syringe-needle U-100 0.5 mL 31 gauge x 5/16" Syrg 1 Syringe by Misc.(Non-Drug; Combo Route) route every evening. 30 each 3    insulin syringe-needle U-100 1 mL 31 gauge x 5/16 Syrg Use as directed to inject insulin. 60 each 3    levocetirizine (XYZAL) 5 MG tablet Take 5 mg by mouth every evening.      omeprazole (PRILOSEC) 20 MG capsule Take 20 mg by mouth once daily.      ondansetron (ZOFRAN) 4 MG tablet Take 4 mg by mouth.      PNV no.153/FA/om3/dha/epa/fish (PRENATAL GUMMIES ORAL) Take by mouth.      TRUEPLUS INSULIN 0.3 mL 31 gauge x 5/16" Syrg SMARTSIG:Injection As Directed       No current facility-administered medications on file prior to visit.          Objective:      /83   Pulse 108   Ht 5' 9" (1.753 m)   Wt 117 kg (257 lb 15 oz)   LMP 01/17/2024 (Approximate)   BMI 38.09 kg/m²   Physical Exam  Constitutional:       General: She is not in acute distress.     Appearance: Normal appearance. She is not ill-appearing.   HENT:      Head: Normocephalic and atraumatic.      Nose: No congestion.   Eyes:      Extraocular Movements: " Extraocular movements intact.   Cardiovascular:      Rate and Rhythm: Normal rate and regular rhythm.      Pulses: Normal pulses.   Pulmonary:      Effort: Pulmonary effort is normal.      Breath sounds: Normal breath sounds.   Abdominal:      General: There is no distension.      Palpations: Abdomen is soft.      Tenderness: There is no abdominal tenderness.   Musculoskeletal:      Comments: Left lower extremity:  Surgical incisions are all well healed.  I am able to palpate her hardware though it is not prominent, no crepitus with palpation or range of motion of the knee.  She is able to perform full extension and flexion of the left knee which is equal to the opposite side.  She does have slight valgus alignment to her left knee compared to the right.  She has some mild swelling and effusion noted.  No calf swelling or tenderness, no signs of DVT.  Distally she has 5/5 motor strength with dorsiflexion plantar flexion of the ankle and digits.  Sensation light touch intact distally.   Skin:     General: Skin is warm and dry.   Neurological:      Mental Status: She is alert and oriented to person, place, and time. Mental status is at baseline.   Psychiatric:         Mood and Affect: Mood normal.         Behavior: Behavior normal.         Thought Content: Thought content normal.         Judgment: Judgment normal.        Body mass index is 38.09 kg/m².    Radiology:   Left knee two views: Images obtained today demonstrate her fracture to be well healed, she does have some lateral joint space collapse with new osteophyte formation comparing imaging that was done in April of 2018.  She has also had an interval shift in the hardware proximal to her main fixation.  It looks like to of the mini frag screws have backed out less than a cm.  No fracture of the hardware.      Assessment:         1. Closed fracture of medial portion of left tibial plateau with routine healing, subsequent encounter  X-Ray Knee 1 or 2 View Left               Plan:       We had an extensive discussion today regarding our findings.  Her hardware does not appear to have significantly loosened than it is not causing any prominence over the lateral aspect of the knee.  It is difficult to determine when the screws moved, it does not appear that this is an acute finding perhaps just incidental following her fall.  Treatment options at this point would be symptomatic treatment with ice rest compression and over-the-counter pain control.  We could also consider removal of her hardware if she continues to have significant discomfort or worsening prominence over the lateral aspect of the knee.  We could also consider an intra-articular corticosteroid injection due to her evidence of osteophyte formation likely irritation of posttraumatic arthritis following a fall.  She is going to discuss he use of intra-articular corticosteroids with her high risk maternal fetal medicine doctor today.  She would like to avoid any operations currently think that has a very reasonable option.  She is currently on Lovenox due to her history of DVT and now her hypercoagulable state she was at risk for recurrence.  She will follow up with us on an as-needed basis to discuss treatment options in the future.  She understands that she may progress to worsening deformity and arthritic changes over time and may in her future require a total knee arthroplasty.  All questions and concerns were addressed.  She will call to discuss options going forward.    This note/OR report was created with the assistance of  voice recognition software or phone  dictation.  There may be transcription errors as a result of using this technology however minimal. Effort has been made to assure accuracy of transcription but any obvious errors or omissions should be clarified with the author of the document.       Hardik Calix MD  Orthopedic Trauma  [facility]      Follow up if symptoms worsen  or fail to improve.    Closed fracture of medial portion of left tibial plateau with routine healing, subsequent encounter  -     X-Ray Knee 1 or 2 View Left; Future; Expected date: 07/17/2024              Orders Placed This Encounter   Procedures    X-Ray Knee 1 or 2 View Left     Standing Status:   Future     Number of Occurrences:   1     Standing Expiration Date:   7/16/2025     Order Specific Question:   May the Radiologist modify the order per protocol to meet the clinical needs of the patient?     Answer:   Yes     Order Specific Question:   Release to patient     Answer:   Immediate       No future appointments.

## 2024-07-17 NOTE — PROGRESS NOTES
Maternal Fetal Medicine Follow Up    Subjective:     Patient ID: [id number]    Chief Complaint: M follow up w/us       HPI: Maggie St is a 39 y.o. female  at 26w3d gestation with Estimated Date of Delivery: 10/20/24  who is here for follow-up consultation by MFM.    She is of advanced maternal age and will be 39 by the [facility].  She had negative cell free DNA and declined amniocentesis.  She has history of postop left lower extremity DVT in 2017 following knee surgery.  She was on Xarelto for 4 months following that. She was on lovenox during her last pregnancy.  She is currently on prophylactic Lovenox 40 mg daily.  She has history of insulin controlled GDM in her last pregnancy and now has recurrent GDM. She is on insulin, 18 units of NPH and 10 units of Humalog in the morning, 8 units of Humalog for supper, and 48 units of NPH at bedtime. She also had gestational hypertension in her last pregnancy, and had mild range BP around 18 weeks this pregnancy. She reports it was a very stressful day.  She has mild chronic hypertension diagnosed in .  She is on no antihypertensives.  She is on low dose aspirin once daily.  She has increased BMI of 38 the at the initial MFM consult visit.       Interval history since last MFM visit: None.. She denies any leaking fluid, vaginal bleeding, contractions, decreased fetal movement. Denies headaches, visual disturbances, or epigastric pain.    Pregnancy complications include:   Patient Active Problem List   Diagnosis    Increased BMI over 35 affecting pregnancy in second trimester    AMA (advanced maternal age) multigravida 35+    At high risk for complications of intrauterine pregnancy (IUP)    History of gestational hypertension    History of deep vein thrombosis (DVT) during pregnancy    History of gestational diabetes mellitus (GDM) in prior pregnancy, currently pregnant    Insulin controlled gestational diabetes mellitus (GDM) in second trimester     "Pre-existing essential hypertension complicating pregnancy, second trimester       No changes to medical, surgical, family, social, or obstetric history.    Medications:  Current Outpatient Medications   Medication Instructions    aspirin 81 mg, Oral, Daily    enoxaparin (LOVENOX) 40 mg, Subcutaneous, Daily    famotidine (PEPCID) 10 mg, Oral, 2 times daily    insulin lispro (HUMALOG U-100 INSULIN) 100 unit/mL injection INJECT 8 UNITS IN AM    insulin NPH (HUMULIN N NPH U-100 INSULIN) 100 unit/mL injection INJECT 18 UNITS IN AM AND 48 UNITS AT BEDTIME    insulin syringe-needle U-100 0.5 mL 31 gauge x 5/16" Syrg 1 Syringe, Misc.(Non-Drug; Combo Route), Nightly    insulin syringe-needle U-100 1 mL 31 gauge x 5/16 Syrg Use as directed to inject insulin.    levocetirizine (XYZAL) 5 mg, Oral, Nightly    omeprazole (PRILOSEC) 20 mg, Oral, Daily    ondansetron (ZOFRAN) 4 mg, Oral    PNV no.153/FA/om3/dha/epa/fish (PRENATAL GUMMIES ORAL) Oral    TRUEPLUS INSULIN 0.3 mL 31 gauge x 5/16" Syrg SMARTSIG:Injection As Directed       Review of Systems   12 point review of systems conducted, negative except as stated in the history of present illness. See HPI for details.      Objective:     Visit Vitals  /72 (BP Location: Left arm, Patient Position: Sitting, BP Method: Large (Automatic))   Pulse 84   Ht 5' 9" (1.753 m)   Wt 117.5 kg (259 lb)   LMP 01/17/2024 (Approximate)   BMI 38.25 kg/m²        Physical Exam  Vitals and nursing note reviewed.   Constitutional:       Appearance: Normal appearance.      Comments: Increased BMI   HENT:      Head: Normocephalic and atraumatic.      Nose: Nose normal. No congestion.   Cardiovascular:      Rate and Rhythm: Normal rate.   Pulmonary:      Effort: Pulmonary effort is normal.   Skin:     Findings: No rash.   Neurological:      Mental Status: She is alert and oriented to person, place, and time.   Psychiatric:         Mood and Affect: Mood normal.         Behavior: Behavior normal.    "      Thought Content: Thought content normal.         Judgment: Judgment normal.         Assessment/Plan:     39 y.o.  female with IUP at 26w3d    Advanced maternal age at 39  There is normal fetal growth with an EFW of 955 g at the 50% and the AC at the 52% on 24.  AFV is normal.      Reviewed risks with AMA, including risks for PTL, FGR, anomalies not seen, aneuploidy and stillbirth at term. She previously declined amniocentesis . She is aware of need for  evaluation. She previously had cell free DNA that was negative .    Fetal surveillance as below.    Delivery timing as below.      History of postop DVT  Advised patient to continue Lovenox 40 mg daily.    Current guidelines of the American Society of Regional Anesthesia and Pain Medicine, suggest that spinal anesthesia may be performed 12 hours after the last prophylactic dose of LMW Heparin, 24 hours after the last therapeutic dose (whether given daily or 2x/day), and 6 hours after the last IV heparin, with a normal PTT. Prophylactic LMW Heparin should not be started before 12 hours after the removal of the epidural catheter. Expectant management.    Delivery Management: I recommend discontinuing Lovenox 24 hour prior to scheduled induction. Alternatively, she can switch to Heparin around 36 weeks gestation.  However if prophylaxis is used with a higher prophylaxis [id number] units every 12 hours recommended close to delivery, regional anesthesia could not be used with a high dose until 12 hours after, making limited benefit of heparin over prophylactic Lovenox.  If full anticoagulation is being use, consideration for switching to heparin until onset of labor with monitoring serial aPTT levels till therapeutic dose of Heparin achieved with use of Protamine for Heparin reversal if indicated.  The need for close monitoring of PTT, risk of under or over anticoagulation during the transition, should be weighed against the potential benefits.        Recurrent GDM  Risks associated with diabetes in pregnancy include higher risk for polyhydramnios, fetal macrosomia, lower Apgar scores and  metabolic complications (hypoglycemia, hyperbilirubinemia, hypocalcemia, erythema) and developing preeclampsia.     Continue 2200 calorie diabetic diet during pregnancy.                  Log reviewed. Patient advised to adjust dose of insulin to 22  units of NPH and 10 units of Humalog in the morning, 10 units of Humalog for supper, and 56 units of NPH at bedtime.     With all blood sugars being in a similar range, She was advised to check glucose 2 times a day, alternating morning and afternoon sugars, and send log/call with values weekly, and bring log to each visit. The goal of treatment is to keep pre-prandial blood sugars below 90 and one hour postprandial below 140.     Fetal testing could be started in this setting around 32 weeks gestation, weekly or twice weekly depending on glucose control and additional comorbidities. She was advised to continue fetal kick counts 3 times daily and PRN, with immediate reporting of decreased fetal movements (<10 movements/hr).     The association of gestational diabetes (50%) with adult-onset type 2 diabetes mellitus was reviewed.  She was advised of importance of losing weight after the pregnancy, as well as doing a 75g 2hr glucose tolerance test after postpartum exam, and checkups every 1-3 years for blood sugars to make sure she does not develop diabetes.         Borderline CHTN  Chronic hypertension is associated with significant adverse pregnancy outcomes including  birth, fetal growth restriction, fetal demise, placental abruption, and  delivery. Based on findings of recent CHAP Study, it is recommended to utilize 140/90 as the threshold for initiation or titration of medical therapy for chronic hypertension in pregnancy, rather than the previously recommended threshold of 160/110. For patients on  blood pressure medications at the start of pregnancy, in the absence of mitigating factors or side effects, they can be maintained on their medications, rather than discontinuing them and waiting to initiate treatment for blood pressures in the severe range.    BP Readings from Last 1 Encounters:   24 111/72     With this BP, she was advised to continue low salt diet.     Low dose aspirin as discussed.    Plan to do follow-up ultrasound and check fetal growth every 4 weeks until the end of pregnancy. Recommend fetal testing starting around 32 weeks gestation until the end of pregnancy.    Among patients with uncomplicated chronic hypertension with no additional risk factors, delivery at 38-39 weeks appears to provide optimal balance between the risk of adverse fetal and  outcomes. If no medication and normal fetal assessment, recommend delivery at 39 weeks. However, will reassess closer to EDC to determine optimal timeframe or GA for delivery, based on current evaluation at that time.        Increased BMI over 35  Body mass index is 38.25 kg/m².  Relatively stable weight recently.  With continue healthy low caloric and low salt diet, she was advised to continue a healthy low caloric diet, low-salt and diabetic diet. Excess weight gain would be associated with gestational hypertension, gestational diabetes and adverse  outcomes, including fetal demise in utero.    It is important to lose weight after the pregnancy is over, especially before a future pregnancy was discussed. Breastfeeding may be an important tool in reducing the postpartum weight retention. Fetal risks were discussed with short term risk of fetal/ obesity and long term risk of adolescent component of metabolic syndrome.    With elevated BMI, fetal testing as discussed elsewhere. She was advised to continue fetal kick counts 3 times daily and PRN, with immediate reporting of decreased fetal movements (<10 movements/hr).        History of gestational hypertension  With increased risk for recurrence, it was agreed to continue asa 81 mg daily until delivery.. Preeclampsia precautions reviewed.      Blood pressure is stable.  Continue low-salt and diabetic diet.  Continue daily aspirin.  Blood sugars are elevated at different times.  Made multiple adjustment dose of insulin.  Patient will continue Lovenox for DVT prophylaxis.  Fetal kick count and preeclampsia warnings.  We will do tele medicine visit in 2 weeks. Patient's questions were answered.  She is in agreement with plan of care.          Follow up in about 2 weeks (around 7/31/2024) for MFM follow-up, Virtual Visit three weeks M follow-up repeat.     No future appointments.       TODD involvement: Patient was evaluated and examined by Dr. Tom. ROMARIO Collado, helped in pre charting of part of note.    This note was created with the assistance of [employer] voice recognition software. There may be transcription errors as a result of using this technology, however minimal. Effort has been made to ensure accuracy of transcription, but any obvious errors or omissions should be clarified with the author of the document.

## 2024-07-31 ENCOUNTER — OFFICE VISIT (OUTPATIENT)
Dept: MATERNAL FETAL MEDICINE | Facility: CLINIC | Age: 39
End: 2024-07-31
Payer: COMMERCIAL

## 2024-07-31 ENCOUNTER — TELEPHONE (OUTPATIENT)
Dept: MATERNAL FETAL MEDICINE | Facility: CLINIC | Age: 39
End: 2024-07-31

## 2024-07-31 DIAGNOSIS — Z86.32 HISTORY OF GESTATIONAL DIABETES MELLITUS (GDM) IN PRIOR PREGNANCY, CURRENTLY PREGNANT: ICD-10-CM

## 2024-07-31 DIAGNOSIS — Z87.59 HISTORY OF DEEP VEIN THROMBOSIS (DVT) DURING PREGNANCY: ICD-10-CM

## 2024-07-31 DIAGNOSIS — O24.414 INSULIN CONTROLLED GESTATIONAL DIABETES MELLITUS (GDM) IN SECOND TRIMESTER: ICD-10-CM

## 2024-07-31 DIAGNOSIS — Z86.718 HISTORY OF DEEP VEIN THROMBOSIS (DVT) DURING PREGNANCY: ICD-10-CM

## 2024-07-31 DIAGNOSIS — O09.523 MULTIGRAVIDA OF ADVANCED MATERNAL AGE IN THIRD TRIMESTER: ICD-10-CM

## 2024-07-31 DIAGNOSIS — O09.299 HISTORY OF GESTATIONAL DIABETES MELLITUS (GDM) IN PRIOR PREGNANCY, CURRENTLY PREGNANT: ICD-10-CM

## 2024-07-31 DIAGNOSIS — O10.013 PRE-EXISTING ESSENTIAL HYPERTENSION AFFECTING PREGNANCY IN THIRD TRIMESTER: Primary | ICD-10-CM

## 2024-07-31 DIAGNOSIS — O24.414 INSULIN CONTROLLED GESTATIONAL DIABETES MELLITUS (GDM) IN THIRD TRIMESTER: ICD-10-CM

## 2024-07-31 DIAGNOSIS — O99.213 SEVERE OBESITY DUE TO EXCESS CALORIES AFFECTING PREGNANCY IN THIRD TRIMESTER: ICD-10-CM

## 2024-07-31 DIAGNOSIS — E66.01 SEVERE OBESITY DUE TO EXCESS CALORIES AFFECTING PREGNANCY IN THIRD TRIMESTER: ICD-10-CM

## 2024-07-31 DIAGNOSIS — Z87.59 HISTORY OF GESTATIONAL HYPERTENSION: ICD-10-CM

## 2024-07-31 PROCEDURE — 1159F MED LIST DOCD IN RCRD: CPT | Mod: CPTII,95,, | Performed by: OBSTETRICS & GYNECOLOGY

## 2024-07-31 PROCEDURE — 1160F RVW MEDS BY RX/DR IN RCRD: CPT | Mod: CPTII,95,, | Performed by: OBSTETRICS & GYNECOLOGY

## 2024-07-31 PROCEDURE — 99441 PR PHYSICIAN TELEPHONE EVALUATION 5-10 MIN: CPT | Mod: 95,,, | Performed by: OBSTETRICS & GYNECOLOGY

## 2024-07-31 RX ORDER — INSULIN HUMAN 100 [IU]/ML
INJECTION, SUSPENSION SUBCUTANEOUS
Qty: 30 ML | Refills: 3 | Status: SHIPPED | OUTPATIENT
Start: 2024-07-31

## 2024-07-31 RX ORDER — SYRINGE,SAFETY WITH NEEDLE,1ML 25GX1"
1 SYRINGE (EA) MISCELLANEOUS 3 TIMES DAILY
Qty: 90 EACH | Refills: 5 | Status: SHIPPED | OUTPATIENT
Start: 2024-07-31 | End: 2024-08-30

## 2024-07-31 RX ORDER — INSULIN LISPRO 100 [IU]/ML
INJECTION, SOLUTION INTRAVENOUS; SUBCUTANEOUS
Qty: 10 ML | Refills: 3 | Status: SHIPPED | OUTPATIENT
Start: 2024-07-31

## 2024-07-31 NOTE — PROGRESS NOTES
Maternal Fetal Medicine Follow Up via telemedicine    The patient location is: [place]  The chief complaint leading to consultation is:  Chronic hypertension and gestational diabetes mellitus on insulin.    Visit type: telephone, as patient did not have access to video.     Face to Face time with patient: 7min    Each patient to whom he or she provides medical services by telemedicine is:  (1) informed of the relationship between the physician and patient and the respective role of any other health care provider with respect to management of the patient; and (2) notified that he or she may decline to receive medical services by telemedicine and may withdraw from such care at any time.    Notes:       Subjective:     Patient ID: [id number]    Chief Complaint: MFM Virtual visit (GDM and CHTN.  Patient is having heart palpations at night. Patient does not having sugar log Fasting 89- 106, Post breakfast 111-135, Post lunch 108- 139 and Post supper 132- 139.)      HPI: Maggie St is a 39 y.o. female  at 28w3d gestation with Estimated Date of Delivery: 10/20/24  who is here for follow-up consultation by [facility].    She is of advanced maternal age and will be 39 by the [facility].  She had negative cell free DNA and declined amniocentesis.  She has history of postop left lower extremity DVT in 2017 following knee surgery.  She was on Xarelto for 4 months following that. She was on lovenox during her last pregnancy.  She is currently on prophylactic Lovenox 40 mg daily.  She has history of insulin controlled GDM in her last pregnancy and now has recurrent GDM. She is on insulin, 22 units of NPH and 10 units of Humalog in the morning, 12 units of Humalog for supper, and 56 units of NPH at bedtime. She also had gestational hypertension in her last pregnancy, and had mild range BP around 18 weeks this pregnancy. She has mild chronic hypertension diagnosed in .  She is on no antihypertensives.  On 2024 she had  "labs with platelets 339, creatinine 0.57, AST 12, ALT 13, , uric acid 3.6, 24 hour urine protein 275 mg.  She is on low dose aspirin once daily.  She has increased BMI of 38 the at the initial [facility] consult visit.    Patient has no complaints and reports good fetal movement today.        Interval history since last M visit: None.. She denies any leaking fluid, vaginal bleeding, contractions, decreased fetal movement. Denies headaches, visual disturbances, or epigastric pain.    Pregnancy complications include:   Patient Active Problem List   Diagnosis    BMI over 35 affecting pregnancy in third trimester    Multigravida of advanced maternal age in third trimester    At high risk for complications of intrauterine pregnancy (IUP)    History of gestational hypertension    History of deep vein thrombosis (DVT) during pregnancy    History of gestational diabetes mellitus (GDM) in prior pregnancy, currently pregnant    Insulin controlled gestational diabetes mellitus (GDM) in third trimester    Pre-existing essential hypertension affecting pregnancy in third trimester       No changes to medical, surgical, family, social, or obstetric history.    Medications:  Current Outpatient Medications   Medication Instructions    aspirin 81 mg, Oral, Daily    enoxaparin (LOVENOX) 40 mg, Subcutaneous, Daily    famotidine (PEPCID) 10 mg, Oral, 2 times daily    insulin lispro (HUMALOG U-100 INSULIN) 100 unit/mL injection INJECT 8 UNITS IN AM    insulin NPH (HUMULIN N NPH U-100 INSULIN) 100 unit/mL injection INJECT 18 UNITS IN AM AND 48 UNITS AT BEDTIME    insulin syringe-needle U-100 1 mL 31 gauge x 5/16 Syrg Use as directed to inject insulin.    levocetirizine (XYZAL) 5 mg, Oral, Nightly    omeprazole (PRILOSEC) 20 mg, Oral, Daily    ondansetron (ZOFRAN) 4 mg, Oral    PNV no.153/FA/om3/dha/epa/fish (PRENATAL GUMMIES ORAL) Oral    TRUEPLUS INSULIN 0.3 mL 31 gauge x 5/16" Syrg SMARTSIG:Injection As Directed       Review of " Systems   12 point review of systems conducted, negative except as stated in the history of present illness. See HPI for details.      Objective:     Visit Vitals  LMP 2024 (Approximate)        Physical Exam  Vitals and nursing note reviewed.   Constitutional:       Appearance: Normal appearance.      Comments: Increased BMI   HENT:      Head: Normocephalic and atraumatic.      Nose: Nose normal. No congestion.   Cardiovascular:      Rate and Rhythm: Normal rate.   Pulmonary:      Effort: Pulmonary effort is normal.   Skin:     Findings: No rash.   Neurological:      Mental Status: She is alert and oriented to person, place, and time.   Psychiatric:         Mood and Affect: Mood normal.         Behavior: Behavior normal.         Thought Content: Thought content normal.         Judgment: Judgment normal.         Assessment/Plan:     39 y.o.  female with IUP at 28w3d    Advanced maternal age at 39  There is normal fetal growth with an EFW of 955 g at the 50% and the AC at the 52% on 24.  Good fetal movement per patient 2024.     Reviewed risks with AMA, including risks for PTL, FGR, anomalies not seen, aneuploidy and stillbirth at term. She previously declined amniocentesis . She is aware of need for  evaluation. She previously had cell free DNA that was negative .    Fetal surveillance as below.    Delivery timing as below.      History of postop DVT  Advised patient to continue Lovenox 40 mg daily.    Current guidelines of the American Society of Regional Anesthesia and Pain Medicine, suggest that spinal anesthesia may be performed 12 hours after the last prophylactic dose of LMW Heparin, 24 hours after the last therapeutic dose (whether given daily or 2x/day), and 6 hours after the last IV heparin, with a normal PTT. Prophylactic LMW Heparin should not be started before 12 hours after the removal of the epidural catheter. Expectant management.    Delivery Management: I recommend  discontinuing Lovenox 24 hour prior to scheduled induction. Alternatively, she can switch to Heparin around 36 weeks gestation.  However if prophylaxis is used with a higher prophylaxis [id number] units every 12 hours recommended close to delivery, regional anesthesia could not be used with a high dose until 12 hours after, making limited benefit of heparin over prophylactic Lovenox.  If full anticoagulation is being use, consideration for switching to heparin until onset of labor with monitoring serial aPTT levels till therapeutic dose of Heparin achieved with use of Protamine for Heparin reversal if indicated.  The need for close monitoring of PTT, risk of under or over anticoagulation during the transition, should be weighed against the potential benefits.       Recurrent GDM  Risks associated with diabetes in pregnancy include higher risk for polyhydramnios, fetal macrosomia, lower Apgar scores and  metabolic complications (hypoglycemia, hyperbilirubinemia, hypocalcemia, erythema) and developing preeclampsia.     Continue 2200 calorie diabetic diet during pregnancy.                  Log was not available but reviewed reported sugars of fasting between 89 and 106, post breakfast between 111 and 135, post lunch 108-139, and post supper 132-139. Patient advised to adjust to 22  units of NPH and 10 units of Humalog in the morning, 12 units of Humalog for supper, and 66 units of NPH at bedtime.     Send new prescription to the pharmacy today 2024.  She was advised to check glucose 2 times a day, alternating morning and afternoon sugars, and send log/call with values weekly, and bring log to each visit. The goal of treatment is to keep pre-prandial blood sugars below 90 and one hour postprandial below 140.     Fetal testing could be started in this setting around 32 weeks gestation, weekly or twice weekly depending on glucose control and additional comorbidities. She was advised to continue fetal kick  counts 3 times daily and PRN, with immediate reporting of decreased fetal movements (<10 movements/hr).     The association of gestational diabetes (50%) with adult-onset type 2 diabetes mellitus was reviewed.  She was advised of importance of losing weight after the pregnancy, as well as doing a 75g 2hr glucose tolerance test after postpartum exam, and checkups every 1-3 years for blood sugars to make sure she does not develop diabetes.         Borderline CHTN  Chronic hypertension is associated with significant adverse pregnancy outcomes including  birth, fetal growth restriction, fetal demise, placental abruption, and  delivery. Based on findings of recent CHAP Study, it is recommended to utilize 140/90 as the threshold for initiation or titration of medical therapy for chronic hypertension in pregnancy, rather than the previously recommended threshold of 160/110. For patients on blood pressure medications at the start of pregnancy, in the absence of mitigating factors or side effects, they can be maintained on their medications, rather than discontinuing them and waiting to initiate treatment for blood pressures in the severe range.    BP Readings from Last 1 Encounters:   24 111/72    Patient reports blood pressures at home are are usually normal 120s to 130s over 80s although sometimes at night could go as high as upper 130s over upper 80s 1 time reading at 90, she was advised to continue low salt diet.     Low dose aspirin as discussed.    Plan to do follow-up ultrasound and check fetal growth every 4 weeks until the end of pregnancy. Recommend fetal testing starting around 32 weeks gestation until the end of pregnancy.    Among patients with uncomplicated chronic hypertension with no additional risk factors, delivery at 38-39 weeks appears to provide optimal balance between the risk of adverse fetal and  outcomes. If no medication and normal fetal assessment, recommend delivery  at 39 weeks. However, will reassess closer to EDC to determine optimal timeframe or GA for delivery, based on current evaluation at that time.        Increased BMI over 35  There is no height or weight on file to calculate BMI.  Relatively stable weight recently.  With continue healthy low caloric and low salt diet, she was advised to continue a healthy low caloric diet, low-salt and diabetic diet. Excess weight gain would be associated with gestational hypertension, gestational diabetes and adverse  outcomes, including fetal demise in utero.    It is important to lose weight after the pregnancy is over, especially before a future pregnancy was discussed. Breastfeeding may be an important tool in reducing the postpartum weight retention. Fetal risks were discussed with short term risk of fetal/ obesity and long term risk of adolescent component of metabolic syndrome.    With elevated BMI, fetal testing as discussed elsewhere. She was advised to continue fetal kick counts 3 times daily and PRN, with immediate reporting of decreased fetal movements (<10 movements/hr).       History of gestational hypertension  With increased risk for recurrence, it was agreed to continue asa 81 mg daily until delivery.. Preeclampsia precautions reviewed.      Follow up for keep return appointment.     Future Appointments   Date Time Provider Department Center   2024  9:00 AM Ananth Tom MD [facility]   2024  9:00 AM ROOM 2, [facility]     TODD involvement: Patient was evaluated and examined by Dr. Tom. ROMARIO Collado, helped in pre charting of part of note.    This note was created with the assistance of [employer] voice recognition software. There may be transcription errors as a result of using this technology, however minimal. Effort has been made to ensure accuracy of transcription, but any obvious errors or omissions should be clarified with the author of the document.

## 2024-08-05 ENCOUNTER — PROCEDURE VISIT (OUTPATIENT)
Dept: MATERNAL FETAL MEDICINE | Facility: CLINIC | Age: 39
End: 2024-08-05
Payer: COMMERCIAL

## 2024-08-05 ENCOUNTER — OFFICE VISIT (OUTPATIENT)
Dept: MATERNAL FETAL MEDICINE | Facility: CLINIC | Age: 39
End: 2024-08-05
Payer: COMMERCIAL

## 2024-08-05 VITALS
BODY MASS INDEX: 38.78 KG/M2 | DIASTOLIC BLOOD PRESSURE: 75 MMHG | HEIGHT: 69 IN | HEART RATE: 89 BPM | WEIGHT: 261.81 LBS | SYSTOLIC BLOOD PRESSURE: 126 MMHG

## 2024-08-05 DIAGNOSIS — Z87.59 HISTORY OF GESTATIONAL HYPERTENSION: ICD-10-CM

## 2024-08-05 DIAGNOSIS — O09.299 HISTORY OF GESTATIONAL DIABETES MELLITUS (GDM) IN PRIOR PREGNANCY, CURRENTLY PREGNANT: ICD-10-CM

## 2024-08-05 DIAGNOSIS — Z86.32 HISTORY OF GESTATIONAL DIABETES MELLITUS (GDM) IN PRIOR PREGNANCY, CURRENTLY PREGNANT: ICD-10-CM

## 2024-08-05 DIAGNOSIS — Z87.59 HISTORY OF DEEP VEIN THROMBOSIS (DVT) DURING PREGNANCY: ICD-10-CM

## 2024-08-05 DIAGNOSIS — Z86.718 HISTORY OF DEEP VEIN THROMBOSIS (DVT) DURING PREGNANCY: ICD-10-CM

## 2024-08-05 DIAGNOSIS — O24.414 INSULIN CONTROLLED GESTATIONAL DIABETES MELLITUS (GDM) IN THIRD TRIMESTER: ICD-10-CM

## 2024-08-05 DIAGNOSIS — O10.013 PRE-EXISTING ESSENTIAL HYPERTENSION AFFECTING PREGNANCY IN THIRD TRIMESTER: Primary | ICD-10-CM

## 2024-08-05 DIAGNOSIS — E66.01 SEVERE OBESITY DUE TO EXCESS CALORIES AFFECTING PREGNANCY IN THIRD TRIMESTER: ICD-10-CM

## 2024-08-05 DIAGNOSIS — O99.213 SEVERE OBESITY DUE TO EXCESS CALORIES AFFECTING PREGNANCY IN THIRD TRIMESTER: ICD-10-CM

## 2024-08-05 DIAGNOSIS — O09.523 MULTIGRAVIDA OF ADVANCED MATERNAL AGE IN THIRD TRIMESTER: ICD-10-CM

## 2024-08-05 DIAGNOSIS — O10.013 PRE-EXISTING ESSENTIAL HYPERTENSION AFFECTING PREGNANCY IN THIRD TRIMESTER: ICD-10-CM

## 2024-08-05 RX ORDER — SYRINGE AND NEEDLE,INSULIN,1ML 31 GX5/16"
SYRINGE, EMPTY DISPOSABLE MISCELLANEOUS
COMMUNITY
Start: 2024-07-31

## 2024-08-13 DIAGNOSIS — O10.013 PRE-EXISTING ESSENTIAL HYPERTENSION AFFECTING PREGNANCY IN THIRD TRIMESTER: Primary | ICD-10-CM

## 2024-08-15 ENCOUNTER — PATIENT MESSAGE (OUTPATIENT)
Dept: MATERNAL FETAL MEDICINE | Facility: CLINIC | Age: 39
End: 2024-08-15
Payer: COMMERCIAL

## 2024-08-16 NOTE — PROGRESS NOTES
Maternal Fetal Medicine Follow Up     Subjective:     Patient ID: [id number]    Chief Complaint: MFM follow up with US (GDM.   )      HPI: Maggie St is a 39 y.o. female  at 31w1d gestation with Estimated Date of Delivery: 10/20/24  who is here for follow-up consultation by MFM.    She is of advanced maternal age and will be 39 by the [facility].  She had negative cell free DNA and declined amniocentesis.  She has history of postop left lower extremity DVT in 2017 following knee surgery.  She was on Xarelto for 4 months following that. She was on lovenox during her last pregnancy.  She is currently on prophylactic Lovenox 40 mg daily.  She has history of insulin controlled GDM in her last pregnancy and now has recurrent GDM. She is on insulin, 22 units of NPH and 10 units of Humalog in the morning, 12 units of Humalog for supper, and 90 units of NPH at bedtime. She also had gestational hypertension in her last pregnancy, and now has mild chronic hypertension diagnosed in .  She is on no antihypertensives at this time.  On 2024 she had labs with platelets 339, creatinine 0.57, AST 12, ALT 13, , uric acid 3.6, 24 hour urine protein 275 mg.  She is on low dose aspirin once daily.  She had increased BMI of 38 the at the initial MFM consult visit.       Interval history since last MFM visit: None.. She denies any leaking fluid, vaginal bleeding, contractions, decreased fetal movement. Denies headaches, visual disturbances, or epigastric pain.    Pregnancy complications include:   Patient Active Problem List   Diagnosis    BMI over 35 affecting pregnancy in third trimester    Multigravida of advanced maternal age in third trimester    At high risk for complications of intrauterine pregnancy (IUP)    History of gestational hypertension    History of deep vein thrombosis (DVT) during pregnancy    History of gestational diabetes mellitus (GDM) in prior pregnancy, currently pregnant    Insulin  "controlled gestational diabetes mellitus (GDM) in third trimester    Pre-existing essential hypertension affecting pregnancy in third trimester       No changes to medical, surgical, family, social, or obstetric history.    Medications:  Current Outpatient Medications   Medication Instructions    aspirin 81 mg, Oral, Daily    COMFORT EZ INSULIN SYRINGE 1 mL 28 gauge x 1/2" Syrg USE WITH INSULIN FOUR TIMES DAILY    COMFORT EZ INSULIN SYRINGE 1 mL 30 gauge x 5/16 Syrg     enoxaparin (LOVENOX) 40 mg, Subcutaneous, Daily    famotidine (PEPCID) 10 mg, Oral, 2 times daily    insulin lispro (HUMALOG U-100 INSULIN) 100 unit/mL injection INJECT 10 UNITS IN AM and 12 at supper    insulin NPH (HUMULIN N NPH U-100 INSULIN) 100 unit/mL injection INJECT 22 UNITS IN AM AND 60 UNITS AT BEDTIME    insulin syringe-needle U-100 1 mL 31 gauge x 5/16 Syrg Use as directed to inject insulin.    insulin syringe-needle U-100 1 mL 31 gauge x 5/16 Syrg 1 Syringe, Misc.(Non-Drug; Combo Route), 3 times daily    levocetirizine (XYZAL) 5 mg, Oral, Nightly    omeprazole (PRILOSEC) 20 mg, Oral, Daily    ondansetron (ZOFRAN) 4 mg    PNV no.153/FA/om3/dha/epa/fish (PRENATAL GUMMIES ORAL) Oral    TRUEPLUS INSULIN 0.3 mL 31 gauge x 5/16" Syrg SMARTSIG:Injection As Directed       Review of Systems   12 point review of systems conducted, negative except as stated in the history of present illness. See HPI for details.      Objective:     Visit Vitals  /86 (BP Location: Left arm, Patient Position: Sitting, BP Method: Large (Automatic))   Pulse 97   Ht 5' 9" (1.753 m)   Wt 120.7 kg (266 lb)   LMP 01/17/2024 (Approximate)   BMI 39.28 kg/m²        Physical Exam  Vitals and nursing note reviewed.   Constitutional:       Appearance: Normal appearance.      Comments: Increased BMI   HENT:      Head: Normocephalic and atraumatic.      Nose: Nose normal. No congestion.   Cardiovascular:      Rate and Rhythm: Normal rate.   Pulmonary:      Effort: Pulmonary " effort is normal.   Skin:     Findings: No rash.   Neurological:      Mental Status: She is alert and oriented to person, place, and time.   Psychiatric:         Mood and Affect: Mood normal.         Behavior: Behavior normal.         Thought Content: Thought content normal.         Judgment: Judgment normal.         Assessment/Plan:     39 y.o.  female with IUP at 31w1d    Advanced maternal age at 39  There was normal fetal growth with an EFW of 1451 g at the 51% and the AC at the 75% on 24.  AFV is normal.  BPP  on today, 2024     Reviewed risks with AMA, including risks for PTL, FGR, anomalies not seen, aneuploidy and stillbirth at term. She previously declined amniocentesis . She is aware of need for  evaluation. She previously had cell free DNA that was negative .    Fetal surveillance as below.    Delivery timing as below.      History of postop DVT  Advised patient to continue Lovenox 40 mg daily.    Current guidelines of the American Society of Regional Anesthesia and Pain Medicine, suggest that spinal anesthesia may be performed 12 hours after the last prophylactic dose of LMW Heparin, 24 hours after the last therapeutic dose (whether given daily or 2x/day), and 6 hours after the last IV heparin, with a normal PTT. Prophylactic LMW Heparin should not be started before 12 hours after the removal of the epidural catheter. Expectant management.    Delivery Management: I recommend discontinuing Lovenox 24 hour prior to scheduled procedure. Alternatively, she can switch to Heparin around 36 weeks gestation.  However if prophylaxis is used with a higher prophylaxis [id number] units every 12 hours recommended close to delivery, regional anesthesia could not be used with a high dose until 12 hours after, making limited benefit of heparin over prophylactic Lovenox.  If full anticoagulation is being use, consideration for switching to heparin until onset of labor with monitoring serial  aPTT levels till therapeutic dose of Heparin achieved with use of Protamine for Heparin reversal if indicated.  The need for close monitoring of PTT, risk of under or over anticoagulation during the transition, should be weighed against the potential benefits.       Recurrent GDM  Risks associated with diabetes in pregnancy include higher risk for polyhydramnios, fetal macrosomia, lower Apgar scores and  metabolic complications (hypoglycemia, hyperbilirubinemia, hypocalcemia, erythema) and developing preeclampsia.     Continue 2200 calorie diabetic diet during pregnancy.                  Log reviewed.  She was advised to continue  dose of insulin 22 units of NPH and 10 units of Humalog in the morning, 12 units of Humalog for supper, and 90 units of NPH at bedtime.  We will add metformin 500 mg with supper.     She may continue to check glucose 2 times a day, alternating morning and afternoon sugars, and send log on Thursday and bring to appointment next week.  Recommend  twice weekly fetal testing starting at 32 weeks, to alternate with primary OB, until delivery.  Continue fetal kick counts    The association of gestational diabetes (50%) with adult-onset type 2 diabetes mellitus was reviewed.  She is aware of importance of losing weight after the pregnancy, as well as doing a 75g 2hr glucose tolerance test after postpartum exam, and checkups every 1-3 years for blood sugars to make sure she does not develop diabetes.         Borderline CHTN  Chronic hypertension is associated with significant adverse pregnancy outcomes including  birth, fetal growth restriction, fetal demise, placental abruption, and  delivery. Based on findings of recent CHAP Study, it is recommended to utilize 140/90 as the threshold for initiation or titration of medical therapy for chronic hypertension in pregnancy, rather than the previously recommended threshold of 160/110. For patients on blood pressure medications at  the start of pregnancy, in the absence of mitigating factors or side effects, they can be maintained on their medications, rather than discontinuing them and waiting to initiate treatment for blood pressures in the severe range.    BP Readings from Last 1 Encounters:   24 112/86   With this blood pressure, she was advised to continue low salt diet.     Continue low dose aspirin as discussed.    Fetal surveillance as above.    Among patients with uncomplicated chronic hypertension with no additional risk factors, delivery at 38-39 weeks appears to provide optimal balance between the risk of adverse fetal and  outcomes. However, with additional risk factors, will reassess closer to EDC to determine optimal timeframe or GA for delivery, based on current evaluation at that time.        Increased BMI over 35  Body mass index is 39.28 kg/m². With excessive weight gain from last visit, 5 lbs in 2 weeks , she was advised to decrease caloric intake and was reminded of the importance of avoiding excessive weight gain.  Excess weight gain would be associated with worsened hypertension, worsening gestational diabetes and adverse  outcomes, including fetal demise in utero.    Reviewed importance of FKC 3/day and prn with instructions to immediately report any decreased fetal movement.    It is important to lose weight after the pregnancy is over, especially before a future pregnancy. Breastfeeding may be an important tool in reducing the postpartum weight retention. Fetal risks were discussed with short term risk of fetal/ obesity and long term risk of adolescent component of metabolic syndrome.      History of gestational hypertension  With increased risk for recurrence, it was agreed to continue asa 81 mg daily until delivery.. Preeclampsia precautions reviewed.        Fasting blood sugars still elevated I am adding metformin to the regimen with supper.  We will plan to keep continue checking her  sugars and she will call us in 3 days with the sugars and we will see her back in 1 week.  We will start twice weekly fetal testing next week with multiple risk factors, alternating with Dr. Alicea where she patient will have a NST later in the week with her and will see her Mondays here .  patient will continue daily aspirin.  Continue low-salt and diabetic diet.    Follow up in about 1 week (around 8/26/2024) for MFM Followup, BPP.     No future appointments.    TODD involvement: Patient was evaluated and examined by Dr. Tom. GLORIA Lang, helped in pre charting of part of note.    This note was created with the assistance of [employer] voice recognition software. There may be transcription errors as a result of using this technology, however minimal. Effort has been made to ensure accuracy of transcription, but any obvious errors or omissions should be clarified with the author of the document.

## 2024-08-19 ENCOUNTER — OFFICE VISIT (OUTPATIENT)
Dept: MATERNAL FETAL MEDICINE | Facility: CLINIC | Age: 39
End: 2024-08-19
Payer: COMMERCIAL

## 2024-08-19 ENCOUNTER — PROCEDURE VISIT (OUTPATIENT)
Dept: MATERNAL FETAL MEDICINE | Facility: CLINIC | Age: 39
End: 2024-08-19
Payer: COMMERCIAL

## 2024-08-19 VITALS
DIASTOLIC BLOOD PRESSURE: 86 MMHG | HEART RATE: 97 BPM | HEIGHT: 69 IN | WEIGHT: 266 LBS | SYSTOLIC BLOOD PRESSURE: 112 MMHG | BODY MASS INDEX: 39.4 KG/M2

## 2024-08-19 DIAGNOSIS — Z86.718 HISTORY OF DEEP VEIN THROMBOSIS (DVT) DURING PREGNANCY: ICD-10-CM

## 2024-08-19 DIAGNOSIS — Z87.59 HISTORY OF GESTATIONAL HYPERTENSION: ICD-10-CM

## 2024-08-19 DIAGNOSIS — O24.414 INSULIN CONTROLLED GESTATIONAL DIABETES MELLITUS (GDM) IN THIRD TRIMESTER: ICD-10-CM

## 2024-08-19 DIAGNOSIS — O09.523 MULTIGRAVIDA OF ADVANCED MATERNAL AGE IN THIRD TRIMESTER: ICD-10-CM

## 2024-08-19 DIAGNOSIS — O99.213 SEVERE OBESITY DUE TO EXCESS CALORIES AFFECTING PREGNANCY IN THIRD TRIMESTER: ICD-10-CM

## 2024-08-19 DIAGNOSIS — Z87.59 HISTORY OF DEEP VEIN THROMBOSIS (DVT) DURING PREGNANCY: ICD-10-CM

## 2024-08-19 DIAGNOSIS — O10.013 PRE-EXISTING ESSENTIAL HYPERTENSION AFFECTING PREGNANCY IN THIRD TRIMESTER: ICD-10-CM

## 2024-08-19 DIAGNOSIS — E66.01 SEVERE OBESITY DUE TO EXCESS CALORIES AFFECTING PREGNANCY IN THIRD TRIMESTER: ICD-10-CM

## 2024-08-19 DIAGNOSIS — O09.90 AT HIGH RISK FOR COMPLICATIONS OF INTRAUTERINE PREGNANCY (IUP): Primary | ICD-10-CM

## 2024-08-19 LAB — GLUCOSE SERPL-MCNC: 72 MG/DL (ref 70–110)

## 2024-08-19 PROCEDURE — 1160F RVW MEDS BY RX/DR IN RCRD: CPT | Mod: CPTII,S$GLB,, | Performed by: OBSTETRICS & GYNECOLOGY

## 2024-08-19 PROCEDURE — 99214 OFFICE O/P EST MOD 30 MIN: CPT | Mod: 25,S$GLB,, | Performed by: OBSTETRICS & GYNECOLOGY

## 2024-08-19 PROCEDURE — 82962 GLUCOSE BLOOD TEST: CPT | Mod: ,,, | Performed by: OBSTETRICS & GYNECOLOGY

## 2024-08-19 PROCEDURE — 3074F SYST BP LT 130 MM HG: CPT | Mod: CPTII,S$GLB,, | Performed by: OBSTETRICS & GYNECOLOGY

## 2024-08-19 PROCEDURE — 3079F DIAST BP 80-89 MM HG: CPT | Mod: CPTII,S$GLB,, | Performed by: OBSTETRICS & GYNECOLOGY

## 2024-08-19 PROCEDURE — 1159F MED LIST DOCD IN RCRD: CPT | Mod: CPTII,S$GLB,, | Performed by: OBSTETRICS & GYNECOLOGY

## 2024-08-19 PROCEDURE — 76819 FETAL BIOPHYS PROFIL W/O NST: CPT | Mod: S$GLB,,, | Performed by: OBSTETRICS & GYNECOLOGY

## 2024-08-19 PROCEDURE — 3008F BODY MASS INDEX DOCD: CPT | Mod: CPTII,S$GLB,, | Performed by: OBSTETRICS & GYNECOLOGY

## 2024-08-21 ENCOUNTER — PATIENT MESSAGE (OUTPATIENT)
Dept: MATERNAL FETAL MEDICINE | Facility: CLINIC | Age: 39
End: 2024-08-21
Payer: COMMERCIAL

## 2024-08-21 DIAGNOSIS — O24.414 INSULIN CONTROLLED GESTATIONAL DIABETES MELLITUS (GDM) IN THIRD TRIMESTER: Primary | ICD-10-CM

## 2024-08-21 RX ORDER — METFORMIN HYDROCHLORIDE 500 MG/1
TABLET ORAL
Qty: 30 TABLET | Refills: 3 | Status: SHIPPED | OUTPATIENT
Start: 2024-08-21

## 2024-08-22 DIAGNOSIS — O09.90 AT HIGH RISK FOR COMPLICATIONS OF INTRAUTERINE PREGNANCY (IUP): ICD-10-CM

## 2024-08-22 DIAGNOSIS — O24.414 INSULIN CONTROLLED GESTATIONAL DIABETES MELLITUS (GDM) IN THIRD TRIMESTER: Primary | ICD-10-CM

## 2024-08-22 DIAGNOSIS — O10.013 PRE-EXISTING ESSENTIAL HYPERTENSION AFFECTING PREGNANCY IN THIRD TRIMESTER: ICD-10-CM

## 2024-08-23 ENCOUNTER — PATIENT MESSAGE (OUTPATIENT)
Dept: MATERNAL FETAL MEDICINE | Facility: CLINIC | Age: 39
End: 2024-08-23
Payer: COMMERCIAL

## 2024-08-23 DIAGNOSIS — O24.414 INSULIN CONTROLLED GESTATIONAL DIABETES MELLITUS (GDM) IN SECOND TRIMESTER: ICD-10-CM

## 2024-08-23 RX ORDER — INSULIN HUMAN 100 [IU]/ML
INJECTION, SUSPENSION SUBCUTANEOUS
Qty: 40 ML | Refills: 3 | Status: SHIPPED | OUTPATIENT
Start: 2024-08-23

## 2024-08-23 RX ORDER — INSULIN LISPRO 100 [IU]/ML
INJECTION, SOLUTION INTRAVENOUS; SUBCUTANEOUS
Qty: 10 ML | Refills: 3 | Status: SHIPPED | OUTPATIENT
Start: 2024-08-23

## 2024-08-23 NOTE — PROGRESS NOTES
Maternal Fetal Medicine Follow Up     Subjective:     Patient ID: [id number]    Chief Complaint: MFM follow up with US (GDM.  )      HPI: Maggie St is a 39 y.o. female  at 32w1d gestation with Estimated Date of Delivery: 10/20/24  who is here for follow-up consultation by MFM.    She is of advanced maternal age and will be 39 by the [facility].  She had negative cell free DNA and declined amniocentesis.  She has history of postop left lower extremity DVT in 2017 following knee surgery.  She was on Xarelto for 4 months following that. She was on lovenox during her last pregnancy.  She is currently on prophylactic Lovenox 40 mg daily.  She has history of insulin controlled GDM in her last pregnancy and now has recurrent GDM. She is on insulin, 22 units of NPH and 10 units of Humalog in the morning, 12 units of Humalog for supper, and 90 units of NPH at bedtime.  She is also taking 500 mg metformin with dinner.  She had gestational hypertension in her last pregnancy, and now has mild chronic hypertension diagnosed in .  She is on no antihypertensives at this time.  On 2024 she had labs with platelets 339, creatinine 0.57, AST 12, ALT 13, , uric acid 3.6, 24 hour urine protein 275 mg.  She is on low dose aspirin once daily.  She had increased BMI of 38 the at the initial MFM consult visit.       Interval history since last MFM visit: None.. She denies any leaking fluid, vaginal bleeding, contractions, decreased fetal movement. Denies headaches, visual disturbances, or epigastric pain.    Pregnancy complications include:   Patient Active Problem List   Diagnosis    BMI over 35 affecting pregnancy in third trimester    Multigravida of advanced maternal age in third trimester    At high risk for complications of intrauterine pregnancy (IUP)    History of gestational hypertension    History of deep vein thrombosis (DVT) during pregnancy    History of gestational diabetes mellitus (GDM) in prior  "pregnancy, currently pregnant    Insulin controlled gestational diabetes mellitus (GDM) in third trimester    Pre-existing essential hypertension affecting pregnancy in third trimester       No changes to medical, surgical, family, social, or obstetric history.    Medications:  Current Outpatient Medications   Medication Instructions    aspirin 81 mg, Oral, Daily    COMFORT EZ INSULIN SYRINGE 1 mL 30 gauge x 5/16 Syrg     enoxaparin (LOVENOX) 40 mg, Subcutaneous, Daily    famotidine (PEPCID) 10 mg, Oral, 2 times daily    insulin lispro (HUMALOG U-100 INSULIN) 100 unit/mL injection INJECT 10 UNITS IN AM and 12 at supper    insulin NPH (HUMULIN N NPH U-100 INSULIN) 100 unit/mL injection INJECT 22 UNITS IN AM AND 90 UNITS AT BEDTIME    insulin syringe-needle U-100 1 mL 31 gauge x 5/16 Syrg Use as directed to inject insulin.    levocetirizine (XYZAL) 5 mg, Oral, Nightly    metFORMIN (GLUCOPHAGE) 500 MG tablet TAKE ONE TABLET BY MOUTH AT SUPPER    omeprazole (PRILOSEC) 20 mg, Oral, Daily    ondansetron (ZOFRAN) 4 mg, Oral    PNV no.153/FA/om3/dha/epa/fish (PRENATAL GUMMIES ORAL) Oral    TRUEPLUS INSULIN 0.3 mL 31 gauge x 5/16" Syrg        Review of Systems   12 point review of systems conducted, negative except as stated in the history of present illness. See HPI for details.      Objective:     Visit Vitals  /78 (BP Location: Left arm, Patient Position: Sitting, BP Method: Large (Automatic))   Pulse 89   Ht 5' 9" (1.753 m)   Wt 120.7 kg (266 lb)   LMP 01/17/2024 (Approximate)   BMI 39.28 kg/m²        Physical Exam  Vitals and nursing note reviewed.   Constitutional:       Appearance: Normal appearance.      Comments: Increased BMI   HENT:      Head: Normocephalic and atraumatic.      Nose: Nose normal. No congestion.   Cardiovascular:      Rate and Rhythm: Normal rate.   Pulmonary:      Effort: Pulmonary effort is normal.   Skin:     Findings: No rash.   Neurological:      Mental Status: She is alert and oriented " to person, place, and time.   Psychiatric:         Mood and Affect: Mood normal.         Behavior: Behavior normal.         Thought Content: Thought content normal.         Judgment: Judgment normal.         Assessment/Plan:     39 y.o.  female with IUP at 32w1d    Advanced maternal age at 39  There was normal fetal growth with an EFW of 1451 g at the 51% and the AC at the 75% on 24.  AFV is normal.  BPP  on today, 2024     She was previously offered and declined amniocentesis.  She had cell free DNA that was negative.  She was aware of the need for routine  evaluation.    Fetal surveillance as below.  Delivery timing as below.      History of postop DVT  Advised patient to continue Lovenox 40 mg daily.    Current guidelines of the American Society of Regional Anesthesia and Pain Medicine, suggest that spinal anesthesia may be performed 12 hours after the last prophylactic dose of LMW Heparin, 24 hours after the last therapeutic dose (whether given daily or 2x/day), and 6 hours after the last IV heparin, with a normal PTT. Prophylactic LMW Heparin should not be started before 12 hours after the removal of the epidural catheter. Expectant management.    Delivery Management: I recommend discontinuing Lovenox 24 hour prior to scheduled procedure. Alternatively, she can switch to Heparin around 36 weeks gestation.  However if prophylaxis is used with a higher prophylaxis [id number] units every 12 hours recommended close to delivery, regional anesthesia could not be used with a high dose until 12 hours after, making limited benefit of heparin over prophylactic Lovenox.  If full anticoagulation is being use, consideration for switching to heparin until onset of labor with monitoring serial aPTT levels till therapeutic dose of Heparin achieved with use of Protamine for Heparin reversal if indicated.  The need for close monitoring of PTT, risk of under or over anticoagulation during the  transition, should be weighed against the potential benefits.       Recurrent GDM  Risks associated with diabetes in pregnancy include higher risk for polyhydramnios, fetal macrosomia, lower Apgar scores and  metabolic complications (hypoglycemia, hyperbilirubinemia, hypocalcemia, erythema) and developing preeclampsia.     Continue 2200 calorie diabetic diet during pregnancy.                  Log reviewed.  She was advised to continue  dose of insulin 22 units of NPH and 10 units of Humalog in the morning, 12 units of Humalog for supper, and 90 units of NPH at bedtime.  She was also advised to continue  metformin 500 mg with supper.     She may continue to check glucose 2 times a day, alternating times and send in log weekly/bring log teach visit.    Recommend  twice weekly fetal testing, until delivery.  Continue fetal kick counts.  Patient will have twice weekly testing next week with Dr. Alicea and will see her back in 2 weeks here      The association of gestational diabetes (50%) with adult-onset type 2 diabetes mellitus was reviewed.  She is aware of importance of losing weight after the pregnancy, as well as doing a 75g 2hr glucose tolerance test after postpartum exam, and checkups every 1-3 years for blood sugars to make sure she does not develop diabetes.         Borderline CHTN  Chronic hypertension is associated with significant adverse pregnancy outcomes including  birth, fetal growth restriction, fetal demise, placental abruption, and  delivery. Based on findings of recent CHAP Study, it is recommended to utilize 140/90 as the threshold for initiation or titration of medical therapy for chronic hypertension in pregnancy, rather than the previously recommended threshold of 160/110. For patients on blood pressure medications at the start of pregnancy, in the absence of mitigating factors or side effects, they can be maintained on their medications, rather than discontinuing them  and waiting to initiate treatment for blood pressures in the severe range.    BP Readings from Last 1 Encounters:   24 122/78   With this blood pressure, she was advised to continue low salt diet with no antihypertensives at this time.    Continue low dose aspirin as discussed.    Fetal surveillance as above.    Among patients with uncomplicated chronic hypertension with no additional risk factors, delivery at 38-39 weeks appears to provide optimal balance between the risk of adverse fetal and  outcomes. However, with additional risk factors, will reassess closer to EDC to determine optimal timeframe or GA for delivery, based on current evaluation at that time.        Increased BMI over 35  Body mass index is 39.28 kg/m². With stable weight since last visit, she was advised to continue healthy, diabetic diet..  Excess weight gain would be associated with worsening hypertension, worsening gestational diabetes and adverse  outcomes, including fetal demise in utero.    Reviewed importance of FKC 3/day and prn with instructions to immediately report any decreased fetal movement.    It is important to lose weight after the pregnancy is over, especially before a future pregnancy. Breastfeeding may be an important tool in reducing the postpartum weight retention. Fetal risks were discussed with short term risk of fetal/ obesity and long term risk of adolescent component of metabolic syndrome.      History of gestational hypertension  With increased risk for recurrence, it was agreed to continue asa 81 mg daily until delivery.. Preeclampsia precautions reviewed.        Follow up in about 2 weeks (around 2024) for MFM Followup, BPP, Repeat Ultrasound.     Future Appointments   Date Time Provider Department Center   2024  8:15 AM ROOM 3, [facility]IG[facility]A [facility]   2024  8:30 AM Ananth Tom MD [facility]         TODD involvement: Patient was evaluated and  examined by Dr. Tom. GLORIA Lang, helped in pre charting of part of note.    This note was created with the assistance of [employer] voice recognition software. There may be transcription errors as a result of using this technology, however minimal. Effort has been made to ensure accuracy of transcription, but any obvious errors or omissions should be clarified with the author of the document.

## 2024-08-26 ENCOUNTER — PROCEDURE VISIT (OUTPATIENT)
Dept: MATERNAL FETAL MEDICINE | Facility: CLINIC | Age: 39
End: 2024-08-26
Payer: COMMERCIAL

## 2024-08-26 ENCOUNTER — OFFICE VISIT (OUTPATIENT)
Dept: MATERNAL FETAL MEDICINE | Facility: CLINIC | Age: 39
End: 2024-08-26
Payer: COMMERCIAL

## 2024-08-26 VITALS
DIASTOLIC BLOOD PRESSURE: 78 MMHG | HEIGHT: 69 IN | BODY MASS INDEX: 39.4 KG/M2 | SYSTOLIC BLOOD PRESSURE: 122 MMHG | HEART RATE: 89 BPM | WEIGHT: 266 LBS

## 2024-08-26 DIAGNOSIS — O24.414 INSULIN CONTROLLED GESTATIONAL DIABETES MELLITUS (GDM) IN THIRD TRIMESTER: ICD-10-CM

## 2024-08-26 DIAGNOSIS — O09.523 MULTIGRAVIDA OF ADVANCED MATERNAL AGE IN THIRD TRIMESTER: ICD-10-CM

## 2024-08-26 DIAGNOSIS — O99.213 SEVERE OBESITY DUE TO EXCESS CALORIES AFFECTING PREGNANCY IN THIRD TRIMESTER: ICD-10-CM

## 2024-08-26 DIAGNOSIS — O09.90 AT HIGH RISK FOR COMPLICATIONS OF INTRAUTERINE PREGNANCY (IUP): ICD-10-CM

## 2024-08-26 DIAGNOSIS — O10.013 PRE-EXISTING ESSENTIAL HYPERTENSION AFFECTING PREGNANCY IN THIRD TRIMESTER: ICD-10-CM

## 2024-08-26 DIAGNOSIS — Z86.32 HISTORY OF GESTATIONAL DIABETES MELLITUS (GDM) IN PRIOR PREGNANCY, CURRENTLY PREGNANT: ICD-10-CM

## 2024-08-26 DIAGNOSIS — E66.01 SEVERE OBESITY DUE TO EXCESS CALORIES AFFECTING PREGNANCY IN THIRD TRIMESTER: ICD-10-CM

## 2024-08-26 DIAGNOSIS — O09.299 HISTORY OF GESTATIONAL DIABETES MELLITUS (GDM) IN PRIOR PREGNANCY, CURRENTLY PREGNANT: ICD-10-CM

## 2024-08-26 DIAGNOSIS — O09.90 AT HIGH RISK FOR COMPLICATIONS OF INTRAUTERINE PREGNANCY (IUP): Primary | ICD-10-CM

## 2024-08-26 LAB — GLUCOSE SERPL-MCNC: 93 MG/DL (ref 70–110)

## 2024-08-26 PROCEDURE — 82962 GLUCOSE BLOOD TEST: CPT | Mod: ,,, | Performed by: OBSTETRICS & GYNECOLOGY

## 2024-08-26 PROCEDURE — 3078F DIAST BP <80 MM HG: CPT | Mod: CPTII,S$GLB,, | Performed by: OBSTETRICS & GYNECOLOGY

## 2024-08-26 PROCEDURE — 76819 FETAL BIOPHYS PROFIL W/O NST: CPT | Mod: S$GLB,,, | Performed by: OBSTETRICS & GYNECOLOGY

## 2024-08-26 PROCEDURE — 1159F MED LIST DOCD IN RCRD: CPT | Mod: CPTII,S$GLB,, | Performed by: OBSTETRICS & GYNECOLOGY

## 2024-08-26 PROCEDURE — 1160F RVW MEDS BY RX/DR IN RCRD: CPT | Mod: CPTII,S$GLB,, | Performed by: OBSTETRICS & GYNECOLOGY

## 2024-08-26 PROCEDURE — 3074F SYST BP LT 130 MM HG: CPT | Mod: CPTII,S$GLB,, | Performed by: OBSTETRICS & GYNECOLOGY

## 2024-08-26 PROCEDURE — 3008F BODY MASS INDEX DOCD: CPT | Mod: CPTII,S$GLB,, | Performed by: OBSTETRICS & GYNECOLOGY

## 2024-08-26 PROCEDURE — 99213 OFFICE O/P EST LOW 20 MIN: CPT | Mod: 25,S$GLB,, | Performed by: OBSTETRICS & GYNECOLOGY

## 2024-09-04 DIAGNOSIS — O10.013 PRE-EXISTING ESSENTIAL HYPERTENSION AFFECTING PREGNANCY IN THIRD TRIMESTER: Primary | ICD-10-CM

## 2024-09-06 NOTE — PROGRESS NOTES
Maternal Fetal Medicine Follow Up     Subjective:     Patient ID: [id number]    Chief Complaint: mfm followup w/us      HPI: Maggie St is a 39 y.o. female  at 34w1d gestation with Estimated Date of Delivery: 10/20/24  who is here for follow-up consultation by MFM.    She is of advanced maternal age and will be 39 by the [facility].  She had negative cell free DNA and declined amniocentesis.  She has history of postop left lower extremity DVT in 2017 following knee surgery.  She was on Xarelto for 4 months following that. She was on lovenox during her last pregnancy.  She is currently on prophylactic Lovenox 40 mg daily.  She has history of insulin controlled GDM in her last pregnancy and now has recurrent GDM. She is on insulin, 22 units of NPH and 10 units of Humalog in the morning, 12 units of Humalog for supper, and 90 units of NPH at bedtime.  She is also taking 500 mg metformin with dinner.  She had gestational hypertension in her last pregnancy, and now has mild chronic hypertension diagnosed in .  She is on no antihypertensives at this time.  On 2024 she had labs with platelets 339, creatinine 0.57, AST 12, ALT 13, , uric acid 3.6, 24 hour urine protein 275 mg.  She is on low dose aspirin once daily.  She had increased BMI of 38 the at the initial MFM consult visit.       Interval history since last MFM visit: None.. She denies any leaking fluid, vaginal bleeding, contractions, decreased fetal movement. Denies headaches, visual disturbances, or epigastric pain.    Pregnancy complications include:   Patient Active Problem List   Diagnosis    BMI over 35 affecting pregnancy in third trimester    Multigravida of advanced maternal age in third trimester    At high risk for complications of intrauterine pregnancy (IUP)    History of gestational hypertension    History of deep vein thrombosis (DVT) during pregnancy    History of gestational diabetes mellitus (GDM) in prior pregnancy,  "currently pregnant    Insulin controlled gestational diabetes mellitus (GDM) in third trimester    Pre-existing essential hypertension affecting pregnancy in third trimester       No changes to medical, surgical, family, social, or obstetric history.    Medications:  Current Outpatient Medications   Medication Instructions    aspirin 81 mg, Oral, Daily    COMFORT EZ INSULIN SYRINGE 1 mL 30 gauge x 5/16 Syrg     enoxaparin (LOVENOX) 40 mg, Subcutaneous, Daily    famotidine (PEPCID) 10 mg, Oral, 2 times daily    insulin lispro (HUMALOG U-100 INSULIN) 100 unit/mL injection INJECT 10 UNITS IN AM and 12 at supper    insulin NPH (HUMULIN N NPH U-100 INSULIN) 100 unit/mL injection INJECT 22 UNITS IN AM AND 90 UNITS AT BEDTIME    insulin syringe-needle U-100 1 mL 31 gauge x 5/16 Syrg Use as directed to inject insulin.    levocetirizine (XYZAL) 5 mg, Oral, Nightly    metFORMIN (GLUCOPHAGE) 500 MG tablet TAKE ONE TABLET BY MOUTH AT SUPPER    omeprazole (PRILOSEC) 20 mg, Oral, Daily    ondansetron (ZOFRAN) 4 mg    PNV no.153/FA/om3/dha/epa/fish (PRENATAL GUMMIES ORAL) Oral    TRUEPLUS INSULIN 0.3 mL 31 gauge x 5/16" Syrg        Review of Systems   12 point review of systems conducted, negative except as stated in the history of present illness. See HPI for details.      Objective:     Visit Vitals  /75 (BP Location: Left arm, Patient Position: Sitting, BP Method: X-Large (Automatic))   Pulse 86   Ht 5' 9" (1.753 m)   Wt 119.9 kg (264 lb 6.4 oz)   LMP 01/17/2024 (Approximate)   BMI 39.05 kg/m²        Physical Exam  Vitals and nursing note reviewed.   Constitutional:       Appearance: Normal appearance.      Comments: Increased BMI   HENT:      Head: Normocephalic and atraumatic.      Nose: Nose normal. No congestion.   Cardiovascular:      Rate and Rhythm: Normal rate.   Pulmonary:      Effort: Pulmonary effort is normal.   Skin:     Findings: No rash.   Neurological:      Mental Status: She is alert and oriented to " person, place, and time.   Psychiatric:         Mood and Affect: Mood normal.         Behavior: Behavior normal.         Thought Content: Thought content normal.         Judgment: Judgment normal.         Assessment/Plan:     39 y.o.  female with IUP at 34w1d    Advanced maternal age at 39  There is normal fetal growth with an EFW of 2575 g at the 42% and the AC at the 80% on 24.  AFV is normal. BPP is 8 today (2024).     She was previously offered and declined amniocentesis.  She had cell free DNA that was negative.  She was aware of the need for routine  evaluation.    Fetal surveillance as below.  Delivery timing as below.      History of postop DVT  Advised patient to continue Lovenox 40 mg daily.    Current guidelines of the American Society of Regional Anesthesia and Pain Medicine, suggest that spinal anesthesia may be performed 12 hours after the last prophylactic dose of LMW Heparin, 24 hours after the last therapeutic dose (whether given daily or 2x/day), and 6 hours after the last IV heparin, with a normal PTT. Prophylactic LMW Heparin should not be started before 12 hours after the removal of the epidural catheter. Expectant management.    Recommend continue Lovenox use for about 6 weeks postpartum.      Recurrent GDM  Risks associated with diabetes in pregnancy include higher risk for polyhydramnios, fetal macrosomia, lower Apgar scores and  metabolic complications (hypoglycemia, hyperbilirubinemia, hypocalcemia, erythema) and developing preeclampsia.     Continue 2200 calorie diabetic diet during pregnancy.                  Log reviewed.  There was a fasting blood sugar that was a little low at 57.  She was advised to adjust the dose of insulin 22 units of NPH and 10 units of Humalog in the morning, 12 units of Humalog for supper, and 82 units of NPH at bedtime. She was also advised to continue  metformin 500 mg with supper.    Patient was advised to check her sugar at  3:00 a.m. and call me tomorrow if it is under 70 otherwise send her sugars later this week and will see her back in 1 week in the office     She may continue to check glucose 2 times a day, alternating times and bring log teach visit.    Recommend continue twice weekly fetal testing, until delivery.  Continue fetal kick counts.     The association of gestational diabetes (50%) with adult-onset type 2 diabetes mellitus was reviewed.  She is aware of importance of losing weight after the pregnancy, as well as doing a 75g 2hr glucose tolerance test after postpartum exam, and checkups every 1-3 years for blood sugars to make sure she does not develop diabetes.         Borderline CHTN  Chronic hypertension is associated with significant adverse pregnancy outcomes including  birth, fetal growth restriction, fetal demise, placental abruption, and  delivery. Based on findings of recent CHAP Study, it is recommended to utilize 140/90 as the threshold for initiation or titration of medical therapy for chronic hypertension in pregnancy, rather than the previously recommended threshold of 160/110. For patients on blood pressure medications at the start of pregnancy, in the absence of mitigating factors or side effects, they can be maintained on their medications, rather than discontinuing them and waiting to initiate treatment for blood pressures in the severe range.    BP Readings from Last 1 Encounters:   24 114/75   With this blood pressure, she was advised to continue low salt diet with no antihypertensives at this time.    Continue low dose aspirin as discussed.    Fetal surveillance as above.    Discussed lack of consensus on optimal time of delivery with  history of borderline hypertension with no medication in this setting with very well controlled diabetes and advanced maternal age.. Different options about optimal timing of delivery exist, in view of lack of data to support a specific approach,  with consideration for delivery at 38-39 weeks,. Risks/benefits of each option, including prematurity with 38 weeks delivery, vs risk of abruption, demise if waiting till 39 weeks, were discussed and patient more comfortable with delivery at 38 weeks, knowing benefits and risks of both options.  Patient will discuss further with Dr. Alicea  and a shared decision-making could be done for delivery timing.  I would recommend 38 week delivery if the blood pressure goes up or additional concerns.  Earlier delivery may be needed for worsening maternal or fetal condition.        Increased BMI over 35  Body mass index is 39.05 kg/m². With relatively stable weight since last visit, she was advised to continue healthy, low-sodium, diabetic diet..  Excess weight gain would be associated with worsening hypertension, worsening gestational diabetes and adverse  outcomes, including fetal demise in utero.    Reviewed importance of FKC 3/day and prn with instructions to immediately report any decreased fetal movement.    It is important to lose weight after the pregnancy is over, especially before a future pregnancy. Breastfeeding may be an important tool in reducing the postpartum weight retention. Fetal risks were discussed with short term risk of fetal/ obesity and long term risk of adolescent component of metabolic syndrome.      History of gestational hypertension  With increased risk for recurrence, it was agreed to continue asa 81 mg daily until delivery.. Preeclampsia precautions reviewed.        No follow-ups on file.     No future appointments.    TODD involvement: Patient was evaluated and examined by Dr. Tom. ROMARIO Collado, helped in pre charting of part of note.    This note was created with the assistance of [employer] voice recognition software. There may be transcription errors as a result of using this technology, however minimal. Effort has been made to ensure accuracy of transcription, but any  obvious errors or omissions should be clarified with the author of the document.

## 2024-09-09 ENCOUNTER — OFFICE VISIT (OUTPATIENT)
Dept: MATERNAL FETAL MEDICINE | Facility: CLINIC | Age: 39
End: 2024-09-09
Payer: COMMERCIAL

## 2024-09-09 ENCOUNTER — PROCEDURE VISIT (OUTPATIENT)
Dept: MATERNAL FETAL MEDICINE | Facility: CLINIC | Age: 39
End: 2024-09-09
Payer: COMMERCIAL

## 2024-09-09 VITALS
WEIGHT: 264.38 LBS | DIASTOLIC BLOOD PRESSURE: 75 MMHG | HEIGHT: 69 IN | BODY MASS INDEX: 39.16 KG/M2 | HEART RATE: 86 BPM | SYSTOLIC BLOOD PRESSURE: 114 MMHG

## 2024-09-09 DIAGNOSIS — Z86.32 HISTORY OF GESTATIONAL DIABETES MELLITUS (GDM) IN PRIOR PREGNANCY, CURRENTLY PREGNANT: ICD-10-CM

## 2024-09-09 DIAGNOSIS — Z87.59 HISTORY OF GESTATIONAL HYPERTENSION: ICD-10-CM

## 2024-09-09 DIAGNOSIS — Z86.718 HISTORY OF DEEP VEIN THROMBOSIS (DVT) DURING PREGNANCY: ICD-10-CM

## 2024-09-09 DIAGNOSIS — E66.01 SEVERE OBESITY DUE TO EXCESS CALORIES AFFECTING PREGNANCY IN THIRD TRIMESTER: ICD-10-CM

## 2024-09-09 DIAGNOSIS — O09.523 MULTIGRAVIDA OF ADVANCED MATERNAL AGE IN THIRD TRIMESTER: ICD-10-CM

## 2024-09-09 DIAGNOSIS — O09.299 HISTORY OF GESTATIONAL DIABETES MELLITUS (GDM) IN PRIOR PREGNANCY, CURRENTLY PREGNANT: ICD-10-CM

## 2024-09-09 DIAGNOSIS — O99.213 SEVERE OBESITY DUE TO EXCESS CALORIES AFFECTING PREGNANCY IN THIRD TRIMESTER: ICD-10-CM

## 2024-09-09 DIAGNOSIS — O10.013 PRE-EXISTING ESSENTIAL HYPERTENSION AFFECTING PREGNANCY IN THIRD TRIMESTER: ICD-10-CM

## 2024-09-09 DIAGNOSIS — O24.414 INSULIN CONTROLLED GESTATIONAL DIABETES MELLITUS (GDM) IN THIRD TRIMESTER: ICD-10-CM

## 2024-09-09 DIAGNOSIS — O09.90 AT HIGH RISK FOR COMPLICATIONS OF INTRAUTERINE PREGNANCY (IUP): ICD-10-CM

## 2024-09-09 DIAGNOSIS — Z87.59 HISTORY OF DEEP VEIN THROMBOSIS (DVT) DURING PREGNANCY: ICD-10-CM

## 2024-09-09 DIAGNOSIS — O10.013 PRE-EXISTING ESSENTIAL HYPERTENSION AFFECTING PREGNANCY IN THIRD TRIMESTER: Primary | ICD-10-CM

## 2024-09-09 LAB — GLUCOSE SERPL-MCNC: 73 MG/DL (ref 70–110)

## 2024-09-09 PROCEDURE — 3008F BODY MASS INDEX DOCD: CPT | Mod: CPTII,S$GLB,, | Performed by: OBSTETRICS & GYNECOLOGY

## 2024-09-09 PROCEDURE — 99214 OFFICE O/P EST MOD 30 MIN: CPT | Mod: 25,S$GLB,, | Performed by: OBSTETRICS & GYNECOLOGY

## 2024-09-09 PROCEDURE — 3078F DIAST BP <80 MM HG: CPT | Mod: CPTII,S$GLB,, | Performed by: OBSTETRICS & GYNECOLOGY

## 2024-09-09 PROCEDURE — 1159F MED LIST DOCD IN RCRD: CPT | Mod: CPTII,S$GLB,, | Performed by: OBSTETRICS & GYNECOLOGY

## 2024-09-09 PROCEDURE — 82962 GLUCOSE BLOOD TEST: CPT | Mod: ,,, | Performed by: OBSTETRICS & GYNECOLOGY

## 2024-09-09 PROCEDURE — 76819 FETAL BIOPHYS PROFIL W/O NST: CPT | Mod: S$GLB,,, | Performed by: OBSTETRICS & GYNECOLOGY

## 2024-09-09 PROCEDURE — 3074F SYST BP LT 130 MM HG: CPT | Mod: CPTII,S$GLB,, | Performed by: OBSTETRICS & GYNECOLOGY

## 2024-09-09 PROCEDURE — 1160F RVW MEDS BY RX/DR IN RCRD: CPT | Mod: CPTII,S$GLB,, | Performed by: OBSTETRICS & GYNECOLOGY

## 2024-09-09 PROCEDURE — 76816 OB US FOLLOW-UP PER FETUS: CPT | Mod: S$GLB,,, | Performed by: OBSTETRICS & GYNECOLOGY

## 2024-09-12 ENCOUNTER — PATIENT MESSAGE (OUTPATIENT)
Dept: MATERNAL FETAL MEDICINE | Facility: CLINIC | Age: 39
End: 2024-09-12
Payer: COMMERCIAL

## 2024-09-12 DIAGNOSIS — O09.523 MULTIGRAVIDA OF ADVANCED MATERNAL AGE IN THIRD TRIMESTER: ICD-10-CM

## 2024-09-12 DIAGNOSIS — O24.414 INSULIN CONTROLLED GESTATIONAL DIABETES MELLITUS (GDM) IN THIRD TRIMESTER: ICD-10-CM

## 2024-09-12 DIAGNOSIS — O10.013 PRE-EXISTING ESSENTIAL HYPERTENSION AFFECTING PREGNANCY IN THIRD TRIMESTER: Primary | ICD-10-CM

## 2024-09-13 ENCOUNTER — PATIENT MESSAGE (OUTPATIENT)
Dept: MATERNAL FETAL MEDICINE | Facility: CLINIC | Age: 39
End: 2024-09-13
Payer: COMMERCIAL

## 2024-09-13 NOTE — PROGRESS NOTES
Maternal Fetal Medicine Follow Up     Subjective:     Patient ID: [id number]    Chief Complaint: mfm followup w/us      HPI: Maggie St is a 39 y.o. female  at 35w1d gestation with Estimated Date of Delivery: 10/20/24  who is here for follow-up consultation by MFM.    She is of advanced maternal age and will be 39 by the [facility].  She had negative cell free DNA and declined amniocentesis.  She has history of postop left lower extremity DVT in 2017 following knee surgery.  She was on Xarelto for 4 months following that. She was on lovenox during her last pregnancy.  She is currently on prophylactic Lovenox 40 mg daily.  She has history of insulin controlled GDM in her last pregnancy and now has recurrent GDM. She is on insulin, 22 units of NPH and 10 units of Humalog in the morning, 12 units of Humalog and 500 mg metformin with supper, and 82 units of NPH at bedtime.  She is also taking 500 mg metformin with dinner.  She had gestational hypertension in her last pregnancy, and now has mild chronic hypertension diagnosed in .  She is on no antihypertensives at this time.  On 2024 she had labs with platelets 339, creatinine 0.57, AST 12, ALT 13, , uric acid 3.6, 24 hour urine protein 275 mg.  She is on low dose aspirin once daily.  She had increased BMI of 38 the at the initial MFM consult visit.       Interval history since last MFM visit: None.. She denies any leaking fluid, vaginal bleeding, contractions, decreased fetal movement. Denies headaches, visual disturbances, or epigastric pain.    Pregnancy complications include:   Patient Active Problem List   Diagnosis    BMI over 35 affecting pregnancy in third trimester    Multigravida of advanced maternal age in third trimester    At high risk for complications of intrauterine pregnancy (IUP)    History of gestational hypertension    History of deep vein thrombosis (DVT) during pregnancy    History of gestational diabetes mellitus (GDM) in  "prior pregnancy, currently pregnant    Insulin controlled gestational diabetes mellitus (GDM) in third trimester    Pre-existing essential hypertension affecting pregnancy in third trimester       No changes to medical, surgical, family, social, or obstetric history.    Medications:  Current Outpatient Medications   Medication Instructions    aspirin 81 mg, Oral, Daily    COMFORT EZ INSULIN SYRINGE 1 mL 30 gauge x 5/16 Syrg     enoxaparin (LOVENOX) 40 mg, Subcutaneous, Daily    famotidine (PEPCID) 10 mg, Oral, 2 times daily    insulin lispro (HUMALOG U-100 INSULIN) 100 unit/mL injection INJECT 10 UNITS IN AM and 12 at supper    insulin NPH (HUMULIN N NPH U-100 INSULIN) 100 unit/mL injection INJECT 22 UNITS IN AM AND 90 UNITS AT BEDTIME    insulin syringe-needle U-100 1 mL 31 gauge x 5/16 Syrg Use as directed to inject insulin.    levocetirizine (XYZAL) 5 mg, Oral, Nightly    metFORMIN (GLUCOPHAGE) 500 MG tablet TAKE ONE TABLET BY MOUTH AT SUPPER    omeprazole (PRILOSEC) 20 mg, Oral, Daily    ondansetron (ZOFRAN) 4 mg    PNV no.153/FA/om3/dha/epa/fish (PRENATAL GUMMIES ORAL) Oral    TRUEPLUS INSULIN 0.3 mL 31 gauge x 5/16" Syrg        Review of Systems   12 point review of systems conducted, negative except as stated in the history of present illness. See HPI for details.      Objective:     Visit Vitals  /81 (BP Location: Left arm, Patient Position: Sitting, BP Method: X-Large (Automatic))   Pulse 101   Ht 5' 9" (1.753 m)   Wt 119 kg (262 lb 6.4 oz)   LMP 01/17/2024 (Approximate)   BMI 38.75 kg/m²        Physical Exam  Vitals and nursing note reviewed.   Constitutional:       Appearance: Normal appearance.      Comments: Increased BMI   HENT:      Head: Normocephalic and atraumatic.      Nose: Nose normal. No congestion.   Cardiovascular:      Rate and Rhythm: Normal rate.   Pulmonary:      Effort: Pulmonary effort is normal.   Skin:     Findings: No rash.   Neurological:      Mental Status: She is alert and " oriented to person, place, and time.   Psychiatric:         Mood and Affect: Mood normal.         Behavior: Behavior normal.         Thought Content: Thought content normal.         Judgment: Judgment normal.         Assessment/Plan:     39 y.o.  female with IUP at 35w1d    Advanced maternal age at 39  There was normal fetal growth with an EFW of 2575 g at the 42% and the AC at the 80% on 24.  AFV is normal. BPP is  today (2024).     She was previously offered and declined amniocentesis.  She had cell free DNA that was negative.  She was aware of the need for routine  evaluation.    Fetal surveillance as below.  Delivery timing as below.      History of postop DVT  Advised patient to continue Lovenox 40 mg daily.    Current guidelines of the American Society of Regional Anesthesia and Pain Medicine, suggest that spinal anesthesia may be performed 12 hours after the last prophylactic dose of LMW Heparin, 24 hours after the last therapeutic dose (whether given daily or 2x/day), and 6 hours after the last IV heparin, with a normal PTT. Prophylactic LMW Heparin should not be started before 12 hours after the removal of the epidural catheter. Expectant management.    Recommend continue Lovenox use for about 6 weeks postpartum.      Recurrent GDM  Risks associated with diabetes in pregnancy include higher risk for polyhydramnios, fetal macrosomia, lower Apgar scores and  metabolic complications (hypoglycemia, hyperbilirubinemia, hypocalcemia, erythema) and developing preeclampsia.     Continue 2200 calorie diabetic diet during pregnancy.                  Log reviewed.  She was advised to adjust the dose of insulin 22 units of NPH and 10 units of Humalog in the morning, 12 units of Humalog for supper, and 82 units of NPH at bedtime. She was also advised to continue  metformin 500 mg with supper.     She may continue to check glucose 2 times a day, alternating times and bring log to each  visit.    Recommend continue twice weekly fetal testing, alternating with primary OB, until delivery.  Continue fetal kick counts.     The association of gestational diabetes (50%) with adult-onset type 2 diabetes mellitus was reviewed.  She is aware of importance of losing weight after the pregnancy, as well as doing a 75g 2hr glucose tolerance test after postpartum exam, and checkups every 1-3 years for blood sugars to make sure she does not develop diabetes.         Borderline CHTN  Chronic hypertension is associated with significant adverse pregnancy outcomes including  birth, fetal growth restriction, fetal demise, placental abruption, and  delivery. Based on findings of recent CHAP Study, it is recommended to utilize 140/90 as the threshold for initiation or titration of medical therapy for chronic hypertension in pregnancy, rather than the previously recommended threshold of 160/110. For patients on blood pressure medications at the start of pregnancy, in the absence of mitigating factors or side effects, they can be maintained on their medications, rather than discontinuing them and waiting to initiate treatment for blood pressures in the severe range.    BP Readings from Last 1 Encounters:   24 125/81   With this blood pressure, she was advised to continue low salt diet with no antihypertensives at this time.    Continue low dose aspirin as discussed.    Fetal surveillance as above.    A shared decision-making was previously accomplished to plan for delivery at 38-39 weeks, knowing benefits and risks of both options.  Patient will discuss further with Dr. Alicea  and a shared decision-making could be done for delivery timing.  Dr. Tom would recommend 38 week delivery if the blood pressure increases or she develops additional concerns.  Earlier delivery may be needed for worsening maternal or fetal condition.      Increased BMI over 35  Body mass index is 38.75 kg/m². With relatively  stable weight recently, she was advised to continue healthy, low-sodium, diabetic diet..  Excess weight gain would be associated with worsening hypertension, worsening gestational diabetes and adverse  outcomes, including fetal demise in utero.    Reviewed importance of FKC 3/day and prn with instructions to immediately report any decreased fetal movement.    It is important to lose weight after the pregnancy is over, especially before a future pregnancy. Breastfeeding may be an important tool in reducing the postpartum weight retention. Fetal risks were discussed with short term risk of fetal/ obesity and long term risk of adolescent component of metabolic syndrome.      History of gestational hypertension  With increased risk for recurrence, it was agreed to continue asa 81 mg daily until delivery.. Preeclampsia precautions reviewed.        Follow up in about 1 week (around 2024) for MFM follow-up, BPP.     Future Appointments   Date Time Provider Department Center   2024  2:30 PM ROOM 1, [facility]   2024  2:45 PM Kayla Finn PA-C [facility]       Kayla Finn PA-C     This note was created with the assistance of [employer] voice recognition software. There may be transcription errors as a result of using this technology, however minimal. Effort has been made to ensure accuracy of transcription, but any obvious errors or omissions should be clarified with the author of the document.

## 2024-09-16 ENCOUNTER — PROCEDURE VISIT (OUTPATIENT)
Dept: MATERNAL FETAL MEDICINE | Facility: CLINIC | Age: 39
End: 2024-09-16
Payer: COMMERCIAL

## 2024-09-16 ENCOUNTER — OFFICE VISIT (OUTPATIENT)
Dept: MATERNAL FETAL MEDICINE | Facility: CLINIC | Age: 39
End: 2024-09-16
Payer: COMMERCIAL

## 2024-09-16 VITALS
HEIGHT: 69 IN | BODY MASS INDEX: 38.86 KG/M2 | DIASTOLIC BLOOD PRESSURE: 81 MMHG | HEART RATE: 101 BPM | WEIGHT: 262.38 LBS | SYSTOLIC BLOOD PRESSURE: 125 MMHG

## 2024-09-16 DIAGNOSIS — O10.013 PRE-EXISTING ESSENTIAL HYPERTENSION AFFECTING PREGNANCY IN THIRD TRIMESTER: ICD-10-CM

## 2024-09-16 DIAGNOSIS — O10.013 PRE-EXISTING ESSENTIAL HYPERTENSION AFFECTING PREGNANCY IN THIRD TRIMESTER: Primary | ICD-10-CM

## 2024-09-16 DIAGNOSIS — O09.523 MULTIGRAVIDA OF ADVANCED MATERNAL AGE IN THIRD TRIMESTER: ICD-10-CM

## 2024-09-16 DIAGNOSIS — Z87.59 HISTORY OF DEEP VEIN THROMBOSIS (DVT) DURING PREGNANCY: ICD-10-CM

## 2024-09-16 DIAGNOSIS — O24.414 INSULIN CONTROLLED GESTATIONAL DIABETES MELLITUS (GDM) IN THIRD TRIMESTER: ICD-10-CM

## 2024-09-16 DIAGNOSIS — O99.213 SEVERE OBESITY DUE TO EXCESS CALORIES AFFECTING PREGNANCY IN THIRD TRIMESTER: ICD-10-CM

## 2024-09-16 DIAGNOSIS — E66.01 SEVERE OBESITY DUE TO EXCESS CALORIES AFFECTING PREGNANCY IN THIRD TRIMESTER: ICD-10-CM

## 2024-09-16 DIAGNOSIS — O09.299 HISTORY OF GESTATIONAL DIABETES MELLITUS (GDM) IN PRIOR PREGNANCY, CURRENTLY PREGNANT: ICD-10-CM

## 2024-09-16 DIAGNOSIS — Z87.59 HISTORY OF GESTATIONAL HYPERTENSION: ICD-10-CM

## 2024-09-16 DIAGNOSIS — Z86.718 HISTORY OF DEEP VEIN THROMBOSIS (DVT) DURING PREGNANCY: ICD-10-CM

## 2024-09-16 DIAGNOSIS — Z86.32 HISTORY OF GESTATIONAL DIABETES MELLITUS (GDM) IN PRIOR PREGNANCY, CURRENTLY PREGNANT: ICD-10-CM

## 2024-09-16 LAB — GLUCOSE SERPL-MCNC: 145 MG/DL (ref 70–110)

## 2024-09-16 PROCEDURE — 82962 GLUCOSE BLOOD TEST: CPT | Mod: ,,,

## 2024-09-16 PROCEDURE — 3074F SYST BP LT 130 MM HG: CPT | Mod: CPTII,S$GLB,,

## 2024-09-16 PROCEDURE — 3008F BODY MASS INDEX DOCD: CPT | Mod: CPTII,S$GLB,,

## 2024-09-16 PROCEDURE — 1159F MED LIST DOCD IN RCRD: CPT | Mod: CPTII,S$GLB,,

## 2024-09-16 PROCEDURE — 99214 OFFICE O/P EST MOD 30 MIN: CPT | Mod: S$GLB,,,

## 2024-09-16 PROCEDURE — 3079F DIAST BP 80-89 MM HG: CPT | Mod: CPTII,S$GLB,,

## 2024-09-16 PROCEDURE — 1160F RVW MEDS BY RX/DR IN RCRD: CPT | Mod: CPTII,S$GLB,,

## 2024-09-16 PROCEDURE — 76819 FETAL BIOPHYS PROFIL W/O NST: CPT | Mod: S$GLB,,, | Performed by: OBSTETRICS & GYNECOLOGY

## 2024-09-20 DIAGNOSIS — O09.299 HISTORY OF GESTATIONAL DIABETES MELLITUS (GDM) IN PRIOR PREGNANCY, CURRENTLY PREGNANT: ICD-10-CM

## 2024-09-20 DIAGNOSIS — Z86.32 HISTORY OF GESTATIONAL DIABETES MELLITUS (GDM) IN PRIOR PREGNANCY, CURRENTLY PREGNANT: ICD-10-CM

## 2024-09-20 DIAGNOSIS — O99.210 OBESITY AFFECTING PREGNANCY: ICD-10-CM

## 2024-09-20 DIAGNOSIS — O24.414 INSULIN CONTROLLED GESTATIONAL DIABETES MELLITUS (GDM) IN THIRD TRIMESTER: ICD-10-CM

## 2024-09-20 DIAGNOSIS — O10.013 PRE-EXISTING ESSENTIAL HYPERTENSION AFFECTING PREGNANCY IN THIRD TRIMESTER: Primary | ICD-10-CM

## 2024-09-20 DIAGNOSIS — O09.523 MULTIGRAVIDA OF ADVANCED MATERNAL AGE IN THIRD TRIMESTER: ICD-10-CM

## 2024-09-20 NOTE — PROGRESS NOTES
Maternal Fetal Medicine Follow Up     Subjective:     Patient ID: [id number]    Chief Complaint: MFM Follow up with US (GDM.  Patient did not bring sugar log Fasting 74-91, Post breakfast 103-144, Post lunch 106-122 and Post supper .  )      HPI: Maggie St is a 39 y.o. female  at 36w1d gestation with Estimated Date of Delivery: 10/20/24  who is here for follow-up consultation by MFM.    She is of advanced maternal age and will be 39 by the [facility].  She had negative cell free DNA and declined amniocentesis.  She has history of postop left lower extremity DVT in 2017 following knee surgery.  She was on Xarelto for 4 months following that, and she was on lovenox during her last pregnancy.  She is currently on prophylactic Lovenox 40 mg daily.  She has history of insulin controlled GDM in her last pregnancy and now has recurrent GDM. She is on insulin, 22 units of NPH and 10 units of Humalog in the morning, 12 units of Humalog with supper, and 82 units of NPH at bedtime.  She is also taking 500 mg metformin with dinner.  She had gestational hypertension in her last pregnancy, and now has mild chronic hypertension diagnosed in .  She is on no antihypertensives at this time.  On 2024 she had normal preE labs and 24 hour urine protein 275 mg.  She is on low dose aspirin once daily.  She had increased BMI of 38 the at the initial MFM consult visit.    Patient did not bring glucose log for review today but reports Fasting 74-91 (most under 90), 1 hour Post breakfast 103-144 (most under 140), 1 hour Post lunch 106-122 and 1 hour Post supper .       Interval history since last MFM visit: None.. She denies any leaking fluid, vaginal bleeding, contractions, decreased fetal movement. Denies headaches, visual disturbances, or epigastric pain.    Pregnancy complications include:   Patient Active Problem List   Diagnosis    BMI over 35 affecting pregnancy in third trimester    Multigravida of  "advanced maternal age in third trimester    At high risk for complications of intrauterine pregnancy (IUP)    History of gestational hypertension    History of deep vein thrombosis (DVT) during pregnancy    History of gestational diabetes mellitus (GDM) in prior pregnancy, currently pregnant    Insulin controlled gestational diabetes mellitus (GDM) in third trimester    Pre-existing essential hypertension affecting pregnancy in third trimester       No changes to medical, surgical, family, social, or obstetric history.    Medications:  Current Outpatient Medications   Medication Instructions    aspirin 81 mg, Oral, Daily    COMFORT EZ INSULIN SYRINGE 1 mL 30 gauge x 5/16 Syrg     enoxaparin (LOVENOX) 40 mg, Subcutaneous, Daily    famotidine (PEPCID) 10 mg, Oral, 2 times daily    insulin lispro (HUMALOG U-100 INSULIN) 100 unit/mL injection INJECT 10 UNITS IN AM and 12 at supper    insulin NPH (HUMULIN N NPH U-100 INSULIN) 100 unit/mL injection INJECT 22 UNITS IN AM AND 90 UNITS AT BEDTIME    insulin syringe-needle U-100 1 mL 31 gauge x 5/16 Syrg Use as directed to inject insulin.    levocetirizine (XYZAL) 5 mg, Oral, Nightly    metFORMIN (GLUCOPHAGE) 500 MG tablet TAKE ONE TABLET BY MOUTH AT SUPPER    omeprazole (PRILOSEC) 20 mg, Oral, Daily    ondansetron (ZOFRAN) 4 mg, Oral    PNV no.153/FA/om3/dha/epa/fish (PRENATAL GUMMIES ORAL) Oral    TRUEPLUS INSULIN 0.3 mL 31 gauge x 5/16" Syrg        Review of Systems   12 point review of systems conducted, negative except as stated in the history of present illness. See HPI for details.      Objective:     Visit Vitals  /82 (BP Location: Left arm, Patient Position: Sitting, BP Method: Large (Automatic))   Pulse 91   Ht 5' 9" (1.753 m)   Wt 120.2 kg (265 lb)   LMP 01/17/2024 (Approximate)   BMI 39.13 kg/m²        Physical Exam  Vitals and nursing note reviewed.   Constitutional:       Appearance: Normal appearance.      Comments: Increased BMI   HENT:      Head: " Normocephalic and atraumatic.      Nose: Nose normal. No congestion.   Cardiovascular:      Rate and Rhythm: Normal rate.   Pulmonary:      Effort: Pulmonary effort is normal.   Skin:     Findings: No rash.   Neurological:      Mental Status: She is alert and oriented to person, place, and time.   Psychiatric:         Mood and Affect: Mood normal.         Behavior: Behavior normal.         Thought Content: Thought content normal.         Judgment: Judgment normal.         Assessment/Plan:     39 y.o.  female with IUP at 36w1d    Advanced maternal age at 39  There was normal fetal growth with an EFW of 2575 g at the 42% and the AC at the 80% on 24.  AFV is normal. BPP is 8/ today (2024).     She was previously offered and declined amniocentesis.  She had cell free DNA that was negative.  She was aware of the need for routine  evaluation.    Fetal surveillance as below.  Delivery timing as below.      History of postop DVT  Advised patient to continue Lovenox 40 mg daily.    Current guidelines of the American Society of Regional Anesthesia and Pain Medicine, suggest that spinal anesthesia may be performed 12 hours after the last prophylactic dose of LMW Heparin, 24 hours after the last therapeutic dose (whether given daily or 2x/day), and 6 hours after the last IV heparin, with a normal PTT. Prophylactic LMW Heparin should not be started before 12 hours after the removal of the epidural catheter. Expectant management.    Recommend continue Lovenox use for about 6 weeks postpartum.      Recurrent GDM  Risks associated with diabetes in pregnancy include higher risk for polyhydramnios, fetal macrosomia, lower Apgar scores and  metabolic complications (hypoglycemia, hyperbilirubinemia, hypocalcemia, erythema) and developing preeclampsia.     Continue 2200 calorie diabetic diet during pregnancy.                  Reported blood sugars reviewed.  She was advised to continue insulin, 22 units  of NPH and 10 units of Humalog in the morning, 12 units of Humalog for supper, and 82 units of NPH at bedtime. She was also advised to continue  metformin 500 mg with supper.     She may continue to check glucose 2 times a day, alternating times and bring log to each visit.    Recommend continue twice weekly fetal testing, alternating with primary OB, until delivery.  Continue fetal kick counts.     The association of gestational diabetes (50%) with adult-onset type 2 diabetes mellitus was reviewed.  She is aware of importance of losing weight after the pregnancy, as well as doing a 75g 2hr glucose tolerance test after postpartum exam, and checkups every 1-3 years for blood sugars to make sure she does not develop diabetes.         Borderline CHTN  Chronic hypertension is associated with significant adverse pregnancy outcomes including  birth, fetal growth restriction, fetal demise, placental abruption, and  delivery. Based on findings of recent CHAP Study, it is recommended to utilize 140/90 as the threshold for initiation or titration of medical therapy for chronic hypertension in pregnancy, rather than the previously recommended threshold of 160/110. For patients on blood pressure medications at the start of pregnancy, in the absence of mitigating factors or side effects, they can be maintained on their medications, rather than discontinuing them and waiting to initiate treatment for blood pressures in the severe range.    BP Readings from Last 1 Encounters:   24 117/82   With this blood pressure, she was advised to continue low salt diet with no antihypertensives at this time.    Continue low dose aspirin as discussed.    Fetal surveillance as above.    A shared decision-making was previously accomplished to plan for delivery at 38-39 weeks, knowing benefits and risks of both options.  Patient will discuss further with Dr. Alicea, and a shared decision-making could be done for delivery  timing.  Dr. Tom would recommend 38 week delivery if the blood pressure increases or she develops additional concerns.  Earlier delivery may be needed for worsening maternal or fetal condition.      Increased BMI over 35  Body mass index is 39.13 kg/m². With relatively stable weight recently, she was advised to continue healthy, low-sodium, diabetic diet..  Excess weight gain would be associated with worsening hypertension, worsening gestational diabetes and adverse  outcomes, including fetal demise in utero.    Reviewed importance of FKC 3/day and prn with instructions to immediately report any decreased fetal movement.    It is important to lose weight after the pregnancy is over, especially before a future pregnancy. Breastfeeding may be an important tool in reducing the postpartum weight retention. Fetal risks were discussed with short term risk of fetal/ obesity and long term risk of adolescent component of metabolic syndrome.      History of gestational hypertension  With increased risk for recurrence, it was agreed to continue asa 81 mg daily until delivery.. Preeclampsia precautions reviewed.        Follow up in about 1 week (around 2024) for [facility] follow-up, BPP.     Future Appointments   Date Time Provider Department Center   2024  2:45 PM Kayla Finn PA-C [facility]   2024  1:45 PM Kayla Finn PA-C [facility]   2024  1:45 PM ROOM 3, [facility]     Kayla Finn PA-C     This note was created with the assistance of [employer] voice recognition software. There may be transcription errors as a result of using this technology, however minimal. Effort has been made to ensure accuracy of transcription, but any obvious errors or omissions should be clarified with the author of the document.

## 2024-09-23 ENCOUNTER — OFFICE VISIT (OUTPATIENT)
Dept: MATERNAL FETAL MEDICINE | Facility: CLINIC | Age: 39
End: 2024-09-23
Payer: COMMERCIAL

## 2024-09-23 ENCOUNTER — PROCEDURE VISIT (OUTPATIENT)
Dept: MATERNAL FETAL MEDICINE | Facility: CLINIC | Age: 39
End: 2024-09-23
Payer: COMMERCIAL

## 2024-09-23 VITALS
WEIGHT: 265 LBS | BODY MASS INDEX: 39.25 KG/M2 | DIASTOLIC BLOOD PRESSURE: 82 MMHG | HEART RATE: 91 BPM | SYSTOLIC BLOOD PRESSURE: 117 MMHG | HEIGHT: 69 IN

## 2024-09-23 DIAGNOSIS — Z87.59 HISTORY OF GESTATIONAL HYPERTENSION: ICD-10-CM

## 2024-09-23 DIAGNOSIS — O09.299 HISTORY OF GESTATIONAL DIABETES MELLITUS (GDM) IN PRIOR PREGNANCY, CURRENTLY PREGNANT: ICD-10-CM

## 2024-09-23 DIAGNOSIS — Z86.32 HISTORY OF GESTATIONAL DIABETES MELLITUS (GDM) IN PRIOR PREGNANCY, CURRENTLY PREGNANT: ICD-10-CM

## 2024-09-23 DIAGNOSIS — O10.013 PRE-EXISTING ESSENTIAL HYPERTENSION AFFECTING PREGNANCY IN THIRD TRIMESTER: ICD-10-CM

## 2024-09-23 DIAGNOSIS — O99.210 OBESITY AFFECTING PREGNANCY: ICD-10-CM

## 2024-09-23 DIAGNOSIS — O24.414 INSULIN CONTROLLED GESTATIONAL DIABETES MELLITUS (GDM) IN THIRD TRIMESTER: ICD-10-CM

## 2024-09-23 DIAGNOSIS — O99.213 SEVERE OBESITY DUE TO EXCESS CALORIES AFFECTING PREGNANCY IN THIRD TRIMESTER: ICD-10-CM

## 2024-09-23 DIAGNOSIS — O09.523 MULTIGRAVIDA OF ADVANCED MATERNAL AGE IN THIRD TRIMESTER: ICD-10-CM

## 2024-09-23 DIAGNOSIS — E66.01 SEVERE OBESITY DUE TO EXCESS CALORIES AFFECTING PREGNANCY IN THIRD TRIMESTER: ICD-10-CM

## 2024-09-23 DIAGNOSIS — Z87.59 HISTORY OF DEEP VEIN THROMBOSIS (DVT) DURING PREGNANCY: ICD-10-CM

## 2024-09-23 DIAGNOSIS — O10.013 PRE-EXISTING ESSENTIAL HYPERTENSION AFFECTING PREGNANCY IN THIRD TRIMESTER: Primary | ICD-10-CM

## 2024-09-23 DIAGNOSIS — Z86.718 HISTORY OF DEEP VEIN THROMBOSIS (DVT) DURING PREGNANCY: ICD-10-CM

## 2024-09-23 LAB — GLUCOSE SERPL-MCNC: 132 MG/DL (ref 70–110)

## 2024-09-23 PROCEDURE — 82962 GLUCOSE BLOOD TEST: CPT | Mod: ,,,

## 2024-09-23 PROCEDURE — 1160F RVW MEDS BY RX/DR IN RCRD: CPT | Mod: CPTII,S$GLB,,

## 2024-09-23 PROCEDURE — 3074F SYST BP LT 130 MM HG: CPT | Mod: CPTII,S$GLB,,

## 2024-09-23 PROCEDURE — 1159F MED LIST DOCD IN RCRD: CPT | Mod: CPTII,S$GLB,,

## 2024-09-23 PROCEDURE — 3008F BODY MASS INDEX DOCD: CPT | Mod: CPTII,S$GLB,,

## 2024-09-23 PROCEDURE — 99214 OFFICE O/P EST MOD 30 MIN: CPT | Mod: S$GLB,,,

## 2024-09-23 PROCEDURE — 3079F DIAST BP 80-89 MM HG: CPT | Mod: CPTII,S$GLB,,

## 2024-09-23 PROCEDURE — 76819 FETAL BIOPHYS PROFIL W/O NST: CPT | Mod: S$GLB,,, | Performed by: OBSTETRICS & GYNECOLOGY

## 2024-09-25 DIAGNOSIS — O99.210 OBESITY AFFECTING PREGNANCY: ICD-10-CM

## 2024-09-25 DIAGNOSIS — O10.013 PRE-EXISTING ESSENTIAL HYPERTENSION AFFECTING PREGNANCY IN THIRD TRIMESTER: Primary | ICD-10-CM

## 2024-09-25 DIAGNOSIS — O24.414 INSULIN CONTROLLED GESTATIONAL DIABETES MELLITUS (GDM) IN THIRD TRIMESTER: ICD-10-CM

## 2024-09-25 DIAGNOSIS — O09.523 MULTIGRAVIDA OF ADVANCED MATERNAL AGE IN THIRD TRIMESTER: ICD-10-CM

## 2024-09-27 NOTE — PROGRESS NOTES
Maternal Fetal Medicine Follow Up     Subjective:     Patient ID: [id number]    Chief Complaint: mfm followup w/us      HPI: Maggie St is a 39 y.o. female  at 37w1d gestation with Estimated Date of Delivery: 10/20/24  who is here for follow-up consultation by MFM.    She is of advanced maternal age and will be 39 by the [facility].  She had negative cell free DNA and declined amniocentesis.  She has history of postop left lower extremity DVT in 2017 following knee surgery.  She was on Xarelto for 4 months following that, and she was on lovenox during her last pregnancy.  She is currently on prophylactic Lovenox 40 mg daily.  She has history of insulin controlled GDM in her last pregnancy and now has recurrent GDM. She is on insulin, 22 units of NPH and 10 units of Humalog in the morning, 12 units of Humalog with supper, and 82 units of NPH at bedtime.  She is also taking 500 mg metformin with dinner.  She had gestational hypertension in her last pregnancy, and now has mild chronic hypertension diagnosed in .  She is on no antihypertensives at this time.  On 2024 she had normal preE labs and 24 hour urine protein 275 mg.  She is on low dose aspirin once daily.  She had increased BMI of 38 the at the initial MFM consult visit.    She did not bring a glucose log for review today but reports fasting values 74-92 and states she does remember the rest.       Interval history since last MFM visit: None.. She denies any leaking fluid, vaginal bleeding, contractions, decreased fetal movement. Denies headaches, visual disturbances, or epigastric pain.    Pregnancy complications include:   Patient Active Problem List   Diagnosis    BMI over 35 affecting pregnancy in third trimester    Multigravida of advanced maternal age in third trimester    At high risk for complications of intrauterine pregnancy (IUP)    History of gestational hypertension    History of deep vein thrombosis (DVT) during pregnancy     "History of gestational diabetes mellitus (GDM) in prior pregnancy, currently pregnant    Insulin controlled gestational diabetes mellitus (GDM) in third trimester    Pre-existing essential hypertension affecting pregnancy in third trimester       No changes to medical, surgical, family, social, or obstetric history.    Medications:  Current Outpatient Medications   Medication Instructions    aspirin 81 mg, Daily    COMFORT EZ INSULIN SYRINGE 1 mL 30 gauge x 5/16 Syrg     enoxaparin (LOVENOX) 40 mg, Subcutaneous, Daily    famotidine (PEPCID) 10 mg, 2 times daily    insulin lispro (HUMALOG U-100 INSULIN) 100 unit/mL injection INJECT 10 UNITS IN AM and 12 at supper    insulin NPH (HUMULIN N NPH U-100 INSULIN) 100 unit/mL injection INJECT 22 UNITS IN AM AND 90 UNITS AT BEDTIME    insulin syringe-needle U-100 1 mL 31 gauge x 5/16 Syrg Use as directed to inject insulin.    levocetirizine (XYZAL) 5 mg, Nightly    metFORMIN (GLUCOPHAGE) 500 MG tablet TAKE ONE TABLET BY MOUTH AT SUPPER    omeprazole (PRILOSEC) 20 mg, Daily    ondansetron (ZOFRAN) 4 mg    PNV no.153/FA/om3/dha/epa/fish (PRENATAL GUMMIES ORAL) Take by mouth.    TRUEPLUS INSULIN 0.3 mL 31 gauge x 5/16" Syrg        Review of Systems   12 point review of systems conducted, negative except as stated in the history of present illness. See HPI for details.      Objective:     Visit Vitals  /77 (BP Location: Left arm, Patient Position: Sitting)   Pulse 78   Ht 5' 9" (1.753 m)   Wt 120.4 kg (265 lb 6.4 oz)   LMP 01/17/2024 (Approximate)   BMI 39.19 kg/m²        Physical Exam  Vitals and nursing note reviewed.   Constitutional:       Appearance: Normal appearance.      Comments: Increased BMI   HENT:      Head: Normocephalic and atraumatic.      Nose: Nose normal. No congestion.   Cardiovascular:      Rate and Rhythm: Normal rate.   Pulmonary:      Effort: Pulmonary effort is normal.   Skin:     Findings: No rash.   Neurological:      Mental Status: She is alert " and oriented to person, place, and time.   Psychiatric:         Mood and Affect: Mood normal.         Behavior: Behavior normal.         Thought Content: Thought content normal.         Judgment: Judgment normal.         Assessment/Plan:     39 y.o.  female with IUP at 37w1d    Advanced maternal age at 39  There was normal fetal growth with an EFW of 2575 g at the 42% and the AC at the 80% on 24.  AFV is normal. BPP is  today (2024).     She was previously offered and declined amniocentesis.  She had cell free DNA that was negative.  She was aware of the need for routine  evaluation.    Fetal surveillance as below.  Delivery timing as below.      History of postop DVT  Advised patient to continue Lovenox 40 mg daily.    Current guidelines of the American Society of Regional Anesthesia and Pain Medicine, suggest that spinal anesthesia may be performed 12 hours after the last prophylactic dose of LMW Heparin, 24 hours after the last therapeutic dose (whether given daily or 2x/day), and 6 hours after the last IV heparin, with a normal PTT. Prophylactic LMW Heparin should not be started before 12 hours after the removal of the epidural catheter. Expectant management.    Recommend continue Lovenox use for about 6 weeks postpartum.      Recurrent GDM  Risks associated with diabetes in pregnancy include higher risk for polyhydramnios, fetal macrosomia, lower Apgar scores and  metabolic complications (hypoglycemia, hyperbilirubinemia, hypocalcemia, erythema) and developing preeclampsia.     Continue 2200 calorie diabetic diet during pregnancy.                  Reported blood sugars reviewed.  She was advised to continue insulin, 22 units of NPH and 10 units of Humalog in the morning, 12 units of Humalog for supper, and 82 units of NPH at bedtime. She was also advised to continue  metformin 500 mg with supper.     She may continue to check glucose 2 times a day, alternating times and bring  log to each visit.    Recommend continue twice weekly fetal testing, alternating with primary OB, until delivery.  Continue fetal kick counts.     The association of gestational diabetes (50%) with adult-onset type 2 diabetes mellitus was reviewed.  She is aware of importance of losing weight after the pregnancy, as well as doing a 75g 2hr glucose tolerance test after postpartum exam, and checkups every 1-3 years for blood sugars to make sure she does not develop diabetes.         Borderline CHTN  Chronic hypertension is associated with significant adverse pregnancy outcomes including  birth, fetal growth restriction, fetal demise, placental abruption, and  delivery. Based on findings of recent CHAP Study, it is recommended to utilize 140/90 as the threshold for initiation or titration of medical therapy for chronic hypertension in pregnancy, rather than the previously recommended threshold of 160/110. For patients on blood pressure medications at the start of pregnancy, in the absence of mitigating factors or side effects, they can be maintained on their medications, rather than discontinuing them and waiting to initiate treatment for blood pressures in the severe range.    BP Readings from Last 1 Encounters:   24 121/77   With this blood pressure, she was advised to continue low salt diet with no antihypertensives at this time.    Continue low dose aspirin as discussed.    Fetal surveillance as above.    A shared decision-making was previously accomplished to plan for delivery at 38-39 weeks, knowing benefits and risks of both options.  Patient will discuss further with Dr. Alicea, and a shared decision-making could be done for delivery timing.  Dr. Tom would recommend 38 week delivery if the blood pressure increases or she develops additional concerns.  Earlier delivery may be needed for worsening maternal or fetal condition.      Increased BMI over 35  Body mass index is 39.19 kg/m². With  relatively stable weight recently, she was advised to continue healthy, low-sodium, diabetic diet..  Excess weight gain would be associated with worsening hypertension, worsening gestational diabetes and adverse  outcomes, including fetal demise in utero.    Reviewed importance of FKC 3/day and prn with instructions to immediately report any decreased fetal movement.    It is important to lose weight after the pregnancy is over, especially before a future pregnancy. Breastfeeding may be an important tool in reducing the postpartum weight retention. Fetal risks were discussed with short term risk of fetal/ obesity and long term risk of adolescent component of metabolic syndrome.      History of gestational hypertension  With increased risk for recurrence, it was agreed to continue asa 81 mg daily until delivery.. Preeclampsia precautions reviewed.        Follow up in about 1 week (around 10/7/2024) for MFM follow-up, BPP, Repeat ultrasound.     Future Appointments   Date Time Provider Department Center   10/7/2024  3:30 PM Ananth Tom MD [facility]   10/7/2024  3:30 PM ROOM 2, [facility]       Kayla Finn PA-C     This note was created with the assistance of [employer] voice recognition software. There may be transcription errors as a result of using this technology, however minimal. Effort has been made to ensure accuracy of transcription, but any obvious errors or omissions should be clarified with the author of the document.

## 2024-09-30 ENCOUNTER — OFFICE VISIT (OUTPATIENT)
Dept: MATERNAL FETAL MEDICINE | Facility: CLINIC | Age: 39
End: 2024-09-30
Payer: COMMERCIAL

## 2024-09-30 ENCOUNTER — PROCEDURE VISIT (OUTPATIENT)
Dept: MATERNAL FETAL MEDICINE | Facility: CLINIC | Age: 39
End: 2024-09-30
Payer: COMMERCIAL

## 2024-09-30 VITALS
BODY MASS INDEX: 39.3 KG/M2 | HEIGHT: 69 IN | HEART RATE: 78 BPM | DIASTOLIC BLOOD PRESSURE: 77 MMHG | SYSTOLIC BLOOD PRESSURE: 121 MMHG | WEIGHT: 265.38 LBS

## 2024-09-30 DIAGNOSIS — Z87.59 HISTORY OF DEEP VEIN THROMBOSIS (DVT) DURING PREGNANCY: ICD-10-CM

## 2024-09-30 DIAGNOSIS — O10.013 PRE-EXISTING ESSENTIAL HYPERTENSION AFFECTING PREGNANCY IN THIRD TRIMESTER: Primary | ICD-10-CM

## 2024-09-30 DIAGNOSIS — O09.299 HISTORY OF GESTATIONAL DIABETES MELLITUS (GDM) IN PRIOR PREGNANCY, CURRENTLY PREGNANT: ICD-10-CM

## 2024-09-30 DIAGNOSIS — E66.01 SEVERE OBESITY DUE TO EXCESS CALORIES AFFECTING PREGNANCY IN THIRD TRIMESTER: ICD-10-CM

## 2024-09-30 DIAGNOSIS — O24.414 INSULIN CONTROLLED GESTATIONAL DIABETES MELLITUS (GDM) IN THIRD TRIMESTER: ICD-10-CM

## 2024-09-30 DIAGNOSIS — O99.213 SEVERE OBESITY DUE TO EXCESS CALORIES AFFECTING PREGNANCY IN THIRD TRIMESTER: ICD-10-CM

## 2024-09-30 DIAGNOSIS — O09.523 MULTIGRAVIDA OF ADVANCED MATERNAL AGE IN THIRD TRIMESTER: ICD-10-CM

## 2024-09-30 DIAGNOSIS — O99.210 OBESITY AFFECTING PREGNANCY: ICD-10-CM

## 2024-09-30 DIAGNOSIS — Z86.718 HISTORY OF DEEP VEIN THROMBOSIS (DVT) DURING PREGNANCY: ICD-10-CM

## 2024-09-30 DIAGNOSIS — Z87.59 HISTORY OF GESTATIONAL HYPERTENSION: ICD-10-CM

## 2024-09-30 DIAGNOSIS — O10.013 PRE-EXISTING ESSENTIAL HYPERTENSION AFFECTING PREGNANCY IN THIRD TRIMESTER: ICD-10-CM

## 2024-09-30 DIAGNOSIS — Z86.32 HISTORY OF GESTATIONAL DIABETES MELLITUS (GDM) IN PRIOR PREGNANCY, CURRENTLY PREGNANT: ICD-10-CM

## 2024-09-30 LAB — GLUCOSE SERPL-MCNC: 63 MG/DL (ref 70–110)

## 2024-09-30 PROCEDURE — 76819 FETAL BIOPHYS PROFIL W/O NST: CPT | Mod: S$GLB,,, | Performed by: OBSTETRICS & GYNECOLOGY

## 2024-10-01 ENCOUNTER — PATIENT MESSAGE (OUTPATIENT)
Dept: MATERNAL FETAL MEDICINE | Facility: CLINIC | Age: 39
End: 2024-10-01
Payer: COMMERCIAL

## 2024-10-01 ENCOUNTER — TELEPHONE (OUTPATIENT)
Dept: MATERNAL FETAL MEDICINE | Facility: CLINIC | Age: 39
End: 2024-10-01
Payer: COMMERCIAL

## 2024-10-01 NOTE — TELEPHONE ENCOUNTER
Patient called stating her sugar was at 24 this morning and now she over treated and it is at 164,  instructed patient to send sugar log for evaluation ----- Message from Daysi sent at 10/1/2024 10:04 AM CDT -----  Regarding: Blood Sugar  Patient has a question about her blood sugar and can be reached at [phone]    Per Kayla instructed patient to check her sugar a few more times at 3 AM and fax her log on Thursday or Friday.

## 2024-10-03 DIAGNOSIS — O99.210 OBESITY AFFECTING PREGNANCY: ICD-10-CM

## 2024-10-03 DIAGNOSIS — O24.414 INSULIN CONTROLLED GESTATIONAL DIABETES MELLITUS (GDM) IN THIRD TRIMESTER: ICD-10-CM

## 2024-10-03 DIAGNOSIS — O09.523 MULTIGRAVIDA OF ADVANCED MATERNAL AGE IN THIRD TRIMESTER: ICD-10-CM

## 2024-10-03 DIAGNOSIS — O10.013 PRE-EXISTING ESSENTIAL HYPERTENSION AFFECTING PREGNANCY IN THIRD TRIMESTER: Primary | ICD-10-CM

## 2024-10-04 ENCOUNTER — PATIENT MESSAGE (OUTPATIENT)
Dept: MATERNAL FETAL MEDICINE | Facility: CLINIC | Age: 39
End: 2024-10-04
Payer: COMMERCIAL

## 2024-10-04 NOTE — PROGRESS NOTES
Maternal Fetal Medicine Follow Up     Subjective:     Patient ID: [id number]    Chief Complaint: MFM follow up with US (GDM.   )      HPI: Maggie St is a 39 y.o. female  at 38w1d gestation with Estimated Date of Delivery: 10/20/24  who is here for follow-up consultation by MFM.    She is of advanced maternal age and will be 39 by the [facility].  She had negative cell free DNA and declined amniocentesis.  She has history of postop left lower extremity DVT in 2017 following knee surgery.  She was on Xarelto for 4 months following that, and she was on lovenox during her last pregnancy.  She is currently on prophylactic Lovenox 40 mg daily.  She has history of insulin controlled GDM in her last pregnancy and now has recurrent GDM. She is on insulin, 22 units of NPH and 10 units of Humalog in the morning, 12 units of Humalog with supper, and 82 units of NPH at bedtime.  She is also taking 500 mg metformin with dinner.  She had gestational hypertension in her last pregnancy, and now has mild chronic hypertension diagnosed in .  She is on no antihypertensives at this time.  On 2024 she had normal preE labs and 24 hour urine protein 275 mg.  She is on low dose aspirin once daily.  She had increased BMI of 38 the at the initial MFM consult visit.         Interval history since last MFM visit: None.. She denies any leaking fluid, vaginal bleeding, contractions, decreased fetal movement. Denies headaches, visual disturbances, or epigastric pain.    Pregnancy complications include:   Patient Active Problem List   Diagnosis    BMI over 35 affecting pregnancy in third trimester    Multigravida of advanced maternal age in third trimester    At high risk for complications of intrauterine pregnancy (IUP)    History of gestational hypertension    History of deep vein thrombosis (DVT) during pregnancy    History of gestational diabetes mellitus (GDM) in prior pregnancy, currently pregnant    Insulin controlled  "gestational diabetes mellitus (GDM) in third trimester    Pre-existing essential hypertension affecting pregnancy in third trimester       No changes to medical, surgical, family, social, or obstetric history.    Medications:  Current Outpatient Medications   Medication Instructions    aspirin 81 mg, Daily    COMFORT EZ INSULIN SYRINGE 1 mL 30 gauge x 5/16 Syrg     enoxaparin (LOVENOX) 40 mg, Subcutaneous, Daily    famotidine (PEPCID) 10 mg, 2 times daily    insulin lispro (HUMALOG U-100 INSULIN) 100 unit/mL injection INJECT 10 UNITS IN AM and 12 at supper    insulin NPH (HUMULIN N NPH U-100 INSULIN) 100 unit/mL injection INJECT 22 UNITS IN AM AND 90 UNITS AT BEDTIME    insulin syringe-needle U-100 1 mL 31 gauge x 5/16 Syrg Use as directed to inject insulin.    levocetirizine (XYZAL) 5 mg, Nightly    metFORMIN (GLUCOPHAGE) 500 MG tablet TAKE ONE TABLET BY MOUTH AT SUPPER    omeprazole (PRILOSEC) 20 mg, Daily    ondansetron (ZOFRAN) 4 mg    PNV no.153/FA/om3/dha/epa/fish (PRENATAL GUMMIES ORAL) Take by mouth.    TRUEPLUS INSULIN 0.3 mL 31 gauge x 5/16" Syrg        Review of Systems   12 point review of systems conducted, negative except as stated in the history of present illness. See HPI for details.      Objective:     Visit Vitals  /84 (BP Location: Left arm, Patient Position: Sitting)   Pulse 81   Ht 5' 9" (1.753 m)   Wt 122.5 kg (270 lb)   LMP 01/17/2024 (Approximate)   BMI 39.87 kg/m²        Physical Exam  Vitals and nursing note reviewed.   Constitutional:       Appearance: Normal appearance.      Comments: Increased BMI   HENT:      Head: Normocephalic and atraumatic.      Nose: Nose normal. No congestion.   Cardiovascular:      Rate and Rhythm: Normal rate.   Pulmonary:      Effort: Pulmonary effort is normal.   Skin:     Findings: No rash.   Neurological:      Mental Status: She is alert and oriented to person, place, and time.   Psychiatric:         Mood and Affect: Mood normal.         Behavior: " Behavior normal.         Thought Content: Thought content normal.         Judgment: Judgment normal.         Assessment/Plan:     39 y.o.  female with IUP at 38w1d    Advanced maternal age at 39  There is upper normal fetal growth with an EFW of 3541 g at the 71% and the AC at the 94% on 10/7/24  AFV is normal. BPP is 8/8 today (10/07/2024).     She was previously offered and declined amniocentesis.  She had cell free DNA that was negative.  She was aware of the need for routine  evaluation.    Fetal surveillance as below.  Delivery timing as below.      History of postop DVT  Advised patient to continue Lovenox 40 mg daily.    Current guidelines of the American Society of Regional Anest[employer], suggest that spinal anesthesia may be performed 12 hours after the last prophylactic dose of LMW Heparin, 24 hours after the last therapeutic dose (whether given daily or 2x/day), and 6 hours after the last IV heparin, with a normal PTT. Prophylactic LMW Heparin should not be started before 12 hours after the removal of the epidural catheter. Expectant management.    Recommend continue Lovenox use for about 6 weeks postpartum.      Recurrent GDM  Risks associated with diabetes in pregnancy include higher risk for polyhydramnios, fetal macrosomia, lower Apgar scores and  metabolic complications (hypoglycemia, hyperbilirubinemia, hypocalcemia, erythema) and developing preeclampsia.     Continue 2200 calorie diabetic diet during pregnancy.                  Log reviewed.  She was advised to continue insulin, 22 units of NPH and 10 units of Humalog in the morning, 12 units of Humalog for supper, and 82 units of NPH at bedtime. She was also advised to continue  metformin 500 mg with supper.     She may continue to check glucose 2 times a day, alternating times and bring log to each visit.    Recommend continue twice weekly fetal testing as planned with primary Ob until delivery.  Continue  fetal kick counts.     The association of gestational diabetes (50%) with adult-onset type 2 diabetes mellitus was reviewed.  She is aware of importance of losing weight after the pregnancy, as well as doing a 75g 2hr glucose tolerance test after postpartum exam, and checkups every 1-3 years for blood sugars to make sure she does not develop diabetes.         Borderline CHTN  Chronic hypertension is associated with significant adverse pregnancy outcomes including  birth, fetal growth restriction, fetal demise, placental abruption, and  delivery. Based on findings of recent CHAP Study, it is recommended to utilize 140/90 as the threshold for initiation or titration of medical therapy for chronic hypertension in pregnancy, rather than the previously recommended threshold of 160/110. For patients on blood pressure medications at the start of pregnancy, in the absence of mitigating factors or side effects, they can be maintained on their medications, rather than discontinuing them and waiting to initiate treatment for blood pressures in the severe range.    BP Readings from Last 1 Encounters:   10/07/24 121/84   With this blood pressure, she was advised to continue low salt diet with no antihypertensives at this time.    Continue low dose aspirin as discussed.    Fetal surveillance as above.    Patient was scheduled to deliver in 2 days that is a reasonable balance between prematurity risks and risk of continued pregnancy.      Increased BMI over 35  Body mass index is 39.87 kg/m². With relatively stable weight recently, she was advised to continue healthy, low-sodium, diabetic diet..  Excess weight gain would be associated with worsening hypertension, worsening gestational diabetes and adverse  outcomes, including fetal demise in utero.    Reviewed importance of FKC 3/day and prn with instructions to immediately report any decreased fetal movement.    It is important to lose weight after the  pregnancy is over, especially before a future pregnancy. Breastfeeding may be an important tool in reducing the postpartum weight retention. Fetal risks were discussed with short term risk of fetal/ obesity and long term risk of adolescent component of metabolic syndrome.      History of gestational hypertension  With increased risk for recurrence, it was agreed to continue asa 81 mg daily until delivery.. Preeclampsia precautions reviewed.        No adjustment was done on dose of insulin.  Patient was advised continue taking Lovenox at bedtime till Tuesday night as she is going Wednesday evening to the hospital for induction of labor.  The importance of healthy diet, weight loss and exercise after pregnancy to decrease the risk of type 2 diabetes was discussed.  Lovenox could be resumed postpartum to be continued for 6 weeks postpartum.    Follow up for None. Keep follow up with primary OB.     No future appointments.    Patient was evaluated and examined by Dr. Tom. GLORIA Lang, helped in pre charting of part of note.      This note was created with the assistance of [employer] voice recognition software. There may be transcription errors as a result of using this technology, however minimal. Effort has been made to ensure accuracy of transcription, but any obvious errors or omissions should be clarified with the author of the document.

## 2024-10-07 ENCOUNTER — PROCEDURE VISIT (OUTPATIENT)
Dept: MATERNAL FETAL MEDICINE | Facility: CLINIC | Age: 39
End: 2024-10-07
Payer: COMMERCIAL

## 2024-10-07 ENCOUNTER — OFFICE VISIT (OUTPATIENT)
Dept: MATERNAL FETAL MEDICINE | Facility: CLINIC | Age: 39
End: 2024-10-07
Payer: COMMERCIAL

## 2024-10-07 VITALS
HEART RATE: 81 BPM | WEIGHT: 270 LBS | SYSTOLIC BLOOD PRESSURE: 121 MMHG | BODY MASS INDEX: 39.99 KG/M2 | HEIGHT: 69 IN | DIASTOLIC BLOOD PRESSURE: 84 MMHG

## 2024-10-07 DIAGNOSIS — O09.299 HISTORY OF GESTATIONAL DIABETES MELLITUS (GDM) IN PRIOR PREGNANCY, CURRENTLY PREGNANT: ICD-10-CM

## 2024-10-07 DIAGNOSIS — O09.523 MULTIGRAVIDA OF ADVANCED MATERNAL AGE IN THIRD TRIMESTER: ICD-10-CM

## 2024-10-07 DIAGNOSIS — O24.414 INSULIN CONTROLLED GESTATIONAL DIABETES MELLITUS (GDM) IN THIRD TRIMESTER: ICD-10-CM

## 2024-10-07 DIAGNOSIS — O99.210 OBESITY AFFECTING PREGNANCY: ICD-10-CM

## 2024-10-07 DIAGNOSIS — O99.213 SEVERE OBESITY DUE TO EXCESS CALORIES AFFECTING PREGNANCY IN THIRD TRIMESTER: ICD-10-CM

## 2024-10-07 DIAGNOSIS — O10.013 PRE-EXISTING ESSENTIAL HYPERTENSION AFFECTING PREGNANCY IN THIRD TRIMESTER: ICD-10-CM

## 2024-10-07 DIAGNOSIS — O09.90 AT HIGH RISK FOR COMPLICATIONS OF INTRAUTERINE PREGNANCY (IUP): Primary | ICD-10-CM

## 2024-10-07 DIAGNOSIS — E66.01 SEVERE OBESITY DUE TO EXCESS CALORIES AFFECTING PREGNANCY IN THIRD TRIMESTER: ICD-10-CM

## 2024-10-07 DIAGNOSIS — Z87.59 HISTORY OF DEEP VEIN THROMBOSIS (DVT) DURING PREGNANCY: ICD-10-CM

## 2024-10-07 DIAGNOSIS — Z86.718 HISTORY OF DEEP VEIN THROMBOSIS (DVT) DURING PREGNANCY: ICD-10-CM

## 2024-10-07 DIAGNOSIS — Z86.32 HISTORY OF GESTATIONAL DIABETES MELLITUS (GDM) IN PRIOR PREGNANCY, CURRENTLY PREGNANT: ICD-10-CM

## 2024-10-07 LAB — GLUCOSE SERPL-MCNC: 107 MG/DL (ref 70–110)

## 2024-10-07 PROCEDURE — 82962 GLUCOSE BLOOD TEST: CPT | Mod: ,,, | Performed by: OBSTETRICS & GYNECOLOGY

## 2024-10-14 ENCOUNTER — TELEPHONE (OUTPATIENT)
Dept: MATERNAL FETAL MEDICINE | Facility: CLINIC | Age: 39
End: 2024-10-14
Payer: COMMERCIAL

## 2024-10-14 DIAGNOSIS — Z87.59 HISTORY OF DEEP VEIN THROMBOSIS (DVT) DURING PREGNANCY: Primary | ICD-10-CM

## 2024-10-14 DIAGNOSIS — Z86.718 HISTORY OF DEEP VEIN THROMBOSIS (DVT) DURING PREGNANCY: Primary | ICD-10-CM

## 2024-10-14 RX ORDER — ENOXAPARIN SODIUM 100 MG/ML
40 INJECTION SUBCUTANEOUS DAILY
Qty: 30 EACH | Refills: 3 | Status: SHIPPED | OUTPATIENT
Start: 2024-10-14

## 2024-10-14 NOTE — TELEPHONE ENCOUNTER
----- Message from Daysi sent at 10/14/2024  1:18 PM CDT -----  Regarding: Pharmacy Call Back  The pharmacist at [facility] would like a call back at [phone]    Called pharmacy back they had a question about her lovenox if she still needed it after she delivered and spoke w/ Dr. Tom and Kayla and she is suppose to stay on it till 6 weeks post partum